# Patient Record
Sex: MALE | Race: WHITE | Employment: UNEMPLOYED | URBAN - METROPOLITAN AREA
[De-identification: names, ages, dates, MRNs, and addresses within clinical notes are randomized per-mention and may not be internally consistent; named-entity substitution may affect disease eponyms.]

---

## 2021-01-01 ENCOUNTER — APPOINTMENT (OUTPATIENT)
Dept: MRI IMAGING | Age: 71
DRG: 180 | End: 2021-01-01
Attending: PHYSICIAN ASSISTANT
Payer: MEDICARE

## 2021-01-01 ENCOUNTER — DOCUMENTATION ONLY (OUTPATIENT)
Dept: ONCOLOGY | Age: 71
End: 2021-01-01

## 2021-01-01 ENCOUNTER — HOSPITAL ENCOUNTER (INPATIENT)
Age: 71
LOS: 4 days | Discharge: HOSPICE | DRG: 180 | End: 2021-01-29
Attending: EMERGENCY MEDICINE | Admitting: STUDENT IN AN ORGANIZED HEALTH CARE EDUCATION/TRAINING PROGRAM
Payer: MEDICARE

## 2021-01-01 ENCOUNTER — HOSPICE ADMISSION (OUTPATIENT)
Dept: HOSPICE | Facility: HOSPICE | Age: 71
End: 2021-01-01
Payer: MEDICARE

## 2021-01-01 ENCOUNTER — HOSPITAL ENCOUNTER (EMERGENCY)
Age: 71
Discharge: OTHER INSTITUTION | End: 2021-01-25
Attending: EMERGENCY MEDICINE
Payer: MEDICARE

## 2021-01-01 ENCOUNTER — APPOINTMENT (OUTPATIENT)
Dept: CT IMAGING | Age: 71
End: 2021-01-01
Attending: EMERGENCY MEDICINE
Payer: MEDICARE

## 2021-01-01 ENCOUNTER — HOSPITAL ENCOUNTER (INPATIENT)
Age: 71
LOS: 1 days | End: 2021-01-30
Attending: FAMILY MEDICINE | Admitting: FAMILY MEDICINE
Payer: MEDICARE

## 2021-01-01 ENCOUNTER — APPOINTMENT (OUTPATIENT)
Dept: GENERAL RADIOLOGY | Age: 71
End: 2021-01-01
Attending: EMERGENCY MEDICINE
Payer: MEDICARE

## 2021-01-01 VITALS
RESPIRATION RATE: 16 BRPM | SYSTOLIC BLOOD PRESSURE: 186 MMHG | OXYGEN SATURATION: 98 % | DIASTOLIC BLOOD PRESSURE: 114 MMHG | TEMPERATURE: 97.9 F | HEART RATE: 80 BPM

## 2021-01-01 VITALS
OXYGEN SATURATION: 71 % | RESPIRATION RATE: 14 BRPM | TEMPERATURE: 95.9 F | HEART RATE: 83 BPM | DIASTOLIC BLOOD PRESSURE: 50 MMHG | SYSTOLIC BLOOD PRESSURE: 102 MMHG

## 2021-01-01 VITALS
BODY MASS INDEX: 21.14 KG/M2 | WEIGHT: 151 LBS | RESPIRATION RATE: 20 BRPM | HEART RATE: 90 BPM | TEMPERATURE: 97.8 F | SYSTOLIC BLOOD PRESSURE: 173 MMHG | HEIGHT: 71 IN | OXYGEN SATURATION: 96 % | DIASTOLIC BLOOD PRESSURE: 120 MMHG

## 2021-01-01 DIAGNOSIS — C34.92 PRIMARY MALIGNANT NEOPLASM OF LEFT LUNG METASTATIC TO OTHER SITE (HCC): ICD-10-CM

## 2021-01-01 DIAGNOSIS — R13.10 DYSPHAGIA, UNSPECIFIED TYPE: ICD-10-CM

## 2021-01-01 DIAGNOSIS — R91.8 MASS OF UPPER LOBE OF LEFT LUNG: ICD-10-CM

## 2021-01-01 DIAGNOSIS — R06.4 LABORED BREATHING: ICD-10-CM

## 2021-01-01 DIAGNOSIS — S22.051A CLOSED STABLE BURST FRACTURE OF FIFTH THORACIC VERTEBRA, INITIAL ENCOUNTER (HCC): ICD-10-CM

## 2021-01-01 DIAGNOSIS — G89.3 CANCER RELATED PAIN: ICD-10-CM

## 2021-01-01 DIAGNOSIS — R06.82 TACHYPNEA: ICD-10-CM

## 2021-01-01 DIAGNOSIS — Z51.5 HOSPICE CARE: ICD-10-CM

## 2021-01-01 DIAGNOSIS — R53.81 PHYSICAL DEBILITY: ICD-10-CM

## 2021-01-01 DIAGNOSIS — C79.51 BONE METASTASES (HCC): ICD-10-CM

## 2021-01-01 DIAGNOSIS — E83.52 HYPERCALCEMIA: ICD-10-CM

## 2021-01-01 DIAGNOSIS — J90 PLEURAL EFFUSION, LEFT: ICD-10-CM

## 2021-01-01 DIAGNOSIS — J90 PLEURAL EFFUSION: Primary | ICD-10-CM

## 2021-01-01 DIAGNOSIS — S22.051A CLOSED STABLE BURST FRACTURE OF FIFTH THORACIC VERTEBRA, INITIAL ENCOUNTER (HCC): Primary | ICD-10-CM

## 2021-01-01 DIAGNOSIS — R62.7 FAILURE TO THRIVE IN ADULT: ICD-10-CM

## 2021-01-01 LAB
ALBUMIN SERPL-MCNC: 2.3 G/DL (ref 3.5–5)
ALBUMIN SERPL-MCNC: 2.3 G/DL (ref 3.5–5)
ALBUMIN/GLOB SERPL: 0.4 {RATIO} (ref 1.1–2.2)
ALBUMIN/GLOB SERPL: 0.4 {RATIO} (ref 1.1–2.2)
ALP SERPL-CCNC: 102 U/L (ref 45–117)
ALP SERPL-CCNC: 106 U/L (ref 45–117)
ALT SERPL-CCNC: 20 U/L (ref 12–78)
ALT SERPL-CCNC: 23 U/L (ref 12–78)
ANION GAP SERPL CALC-SCNC: 11 MMOL/L (ref 5–15)
ANION GAP SERPL CALC-SCNC: 6 MMOL/L (ref 5–15)
ANION GAP SERPL CALC-SCNC: 9 MMOL/L (ref 5–15)
ANION GAP SERPL CALC-SCNC: 9 MMOL/L (ref 5–15)
AST SERPL-CCNC: 100 U/L (ref 15–37)
AST SERPL-CCNC: 95 U/L (ref 15–37)
ATRIAL RATE: 96 BPM
BASOPHILS # BLD: 0 K/UL (ref 0–0.1)
BASOPHILS NFR BLD: 0 % (ref 0–1)
BILIRUB SERPL-MCNC: 1.1 MG/DL (ref 0.2–1)
BILIRUB SERPL-MCNC: 1.3 MG/DL (ref 0.2–1)
BUN SERPL-MCNC: 28 MG/DL (ref 6–20)
BUN SERPL-MCNC: 29 MG/DL (ref 6–20)
BUN SERPL-MCNC: 30 MG/DL (ref 6–20)
BUN SERPL-MCNC: 31 MG/DL (ref 6–20)
BUN/CREAT SERPL: 20 (ref 12–20)
BUN/CREAT SERPL: 26 (ref 12–20)
BUN/CREAT SERPL: 26 (ref 12–20)
BUN/CREAT SERPL: 29 (ref 12–20)
CALCIUM SERPL-MCNC: 10.2 MG/DL (ref 8.5–10.1)
CALCIUM SERPL-MCNC: 11 MG/DL (ref 8.5–10.1)
CALCIUM SERPL-MCNC: 11 MG/DL (ref 8.5–10.1)
CALCIUM SERPL-MCNC: 11.1 MG/DL (ref 8.5–10.1)
CALCIUM SERPL-MCNC: 11.1 MG/DL (ref 8.5–10.1)
CALCULATED P AXIS, ECG09: 60 DEGREES
CALCULATED R AXIS, ECG10: 29 DEGREES
CALCULATED T AXIS, ECG11: 75 DEGREES
CHLORIDE SERPL-SCNC: 100 MMOL/L (ref 97–108)
CHLORIDE SERPL-SCNC: 97 MMOL/L (ref 97–108)
CO2 SERPL-SCNC: 22 MMOL/L (ref 21–32)
CO2 SERPL-SCNC: 25 MMOL/L (ref 21–32)
CO2 SERPL-SCNC: 26 MMOL/L (ref 21–32)
CO2 SERPL-SCNC: 30 MMOL/L (ref 21–32)
COVID-19 RAPID TEST, COVR: NOT DETECTED
CREAT SERPL-MCNC: 1.07 MG/DL (ref 0.7–1.3)
CREAT SERPL-MCNC: 1.1 MG/DL (ref 0.7–1.3)
CREAT SERPL-MCNC: 1.17 MG/DL (ref 0.7–1.3)
CREAT SERPL-MCNC: 1.39 MG/DL (ref 0.7–1.3)
DIAGNOSIS, 93000: NORMAL
DIFFERENTIAL METHOD BLD: ABNORMAL
EOSINOPHIL # BLD: 0 K/UL (ref 0–0.4)
EOSINOPHIL NFR BLD: 0 % (ref 0–7)
ERYTHROCYTE [DISTWIDTH] IN BLOOD BY AUTOMATED COUNT: 13.3 % (ref 11.5–14.5)
ERYTHROCYTE [DISTWIDTH] IN BLOOD BY AUTOMATED COUNT: 13.4 % (ref 11.5–14.5)
ETHANOL SERPL-MCNC: <10 MG/DL
GLOBULIN SER CALC-MCNC: 5.2 G/DL (ref 2–4)
GLOBULIN SER CALC-MCNC: 5.4 G/DL (ref 2–4)
GLUCOSE SERPL-MCNC: 102 MG/DL (ref 65–100)
GLUCOSE SERPL-MCNC: 113 MG/DL (ref 65–100)
GLUCOSE SERPL-MCNC: 137 MG/DL (ref 65–100)
GLUCOSE SERPL-MCNC: 150 MG/DL (ref 65–100)
HCT VFR BLD AUTO: 33.2 % (ref 36.6–50.3)
HCT VFR BLD AUTO: 35.5 % (ref 36.6–50.3)
HGB BLD-MCNC: 11.1 G/DL (ref 12.1–17)
HGB BLD-MCNC: 11.9 G/DL (ref 12.1–17)
IMM GRANULOCYTES # BLD AUTO: 0.3 K/UL (ref 0–0.04)
IMM GRANULOCYTES NFR BLD AUTO: 2 % (ref 0–0.5)
LYMPHOCYTES # BLD: 0.9 K/UL (ref 0.8–3.5)
LYMPHOCYTES NFR BLD: 5 % (ref 12–49)
MCH RBC QN AUTO: 28.4 PG (ref 26–34)
MCH RBC QN AUTO: 28.5 PG (ref 26–34)
MCHC RBC AUTO-ENTMCNC: 33.4 G/DL (ref 30–36.5)
MCHC RBC AUTO-ENTMCNC: 33.5 G/DL (ref 30–36.5)
MCV RBC AUTO: 84.7 FL (ref 80–99)
MCV RBC AUTO: 85.3 FL (ref 80–99)
MONOCYTES # BLD: 1.2 K/UL (ref 0–1)
MONOCYTES NFR BLD: 7 % (ref 5–13)
NEUTS SEG # BLD: 14.6 K/UL (ref 1.8–8)
NEUTS SEG NFR BLD: 86 % (ref 32–75)
NRBC # BLD: 0 K/UL (ref 0–0.01)
NRBC # BLD: 0 K/UL (ref 0–0.01)
NRBC BLD-RTO: 0 PER 100 WBC
NRBC BLD-RTO: 0 PER 100 WBC
P-R INTERVAL, ECG05: 148 MS
PLATELET # BLD AUTO: 369 K/UL (ref 150–400)
PLATELET # BLD AUTO: 431 K/UL (ref 150–400)
PMV BLD AUTO: 10.4 FL (ref 8.9–12.9)
PMV BLD AUTO: 9.9 FL (ref 8.9–12.9)
POTASSIUM SERPL-SCNC: 3.8 MMOL/L (ref 3.5–5.1)
POTASSIUM SERPL-SCNC: 4 MMOL/L (ref 3.5–5.1)
POTASSIUM SERPL-SCNC: 4.1 MMOL/L (ref 3.5–5.1)
POTASSIUM SERPL-SCNC: 4.2 MMOL/L (ref 3.5–5.1)
PROT SERPL-MCNC: 7.5 G/DL (ref 6.4–8.2)
PROT SERPL-MCNC: 7.7 G/DL (ref 6.4–8.2)
Q-T INTERVAL, ECG07: 382 MS
QRS DURATION, ECG06: 88 MS
QTC CALCULATION (BEZET), ECG08: 482 MS
RBC # BLD AUTO: 3.89 M/UL (ref 4.1–5.7)
RBC # BLD AUTO: 4.19 M/UL (ref 4.1–5.7)
SARS-COV-2, COV2: NORMAL
SODIUM SERPL-SCNC: 132 MMOL/L (ref 136–145)
SODIUM SERPL-SCNC: 133 MMOL/L (ref 136–145)
SODIUM SERPL-SCNC: 134 MMOL/L (ref 136–145)
SODIUM SERPL-SCNC: 136 MMOL/L (ref 136–145)
SOURCE, COVRS: NORMAL
TROPONIN I SERPL-MCNC: <0.05 NG/ML
VENTRICULAR RATE, ECG03: 96 BPM
WBC # BLD AUTO: 17.2 K/UL (ref 4.1–11.1)
WBC # BLD AUTO: 19.1 K/UL (ref 4.1–11.1)

## 2021-01-01 PROCEDURE — 74011000250 HC RX REV CODE- 250: Performed by: FAMILY MEDICINE

## 2021-01-01 PROCEDURE — 77010033678 HC OXYGEN DAILY

## 2021-01-01 PROCEDURE — 72148 MRI LUMBAR SPINE W/O DYE: CPT

## 2021-01-01 PROCEDURE — 74011250636 HC RX REV CODE- 250/636: Performed by: STUDENT IN AN ORGANIZED HEALTH CARE EDUCATION/TRAINING PROGRAM

## 2021-01-01 PROCEDURE — 94760 N-INVAS EAR/PLS OXIMETRY 1: CPT

## 2021-01-01 PROCEDURE — 0656 HSPC GENERAL INPATIENT

## 2021-01-01 PROCEDURE — 74011250636 HC RX REV CODE- 250/636: Performed by: FAMILY MEDICINE

## 2021-01-01 PROCEDURE — 96375 TX/PRO/DX INJ NEW DRUG ADDON: CPT

## 2021-01-01 PROCEDURE — 74011250637 HC RX REV CODE- 250/637: Performed by: FAMILY MEDICINE

## 2021-01-01 PROCEDURE — 99233 SBSQ HOSP IP/OBS HIGH 50: CPT | Performed by: INTERNAL MEDICINE

## 2021-01-01 PROCEDURE — 74011000636 HC RX REV CODE- 636: Performed by: EMERGENCY MEDICINE

## 2021-01-01 PROCEDURE — 99223 1ST HOSP IP/OBS HIGH 75: CPT | Performed by: NURSE PRACTITIONER

## 2021-01-01 PROCEDURE — 97161 PT EVAL LOW COMPLEX 20 MIN: CPT

## 2021-01-01 PROCEDURE — 65660000000 HC RM CCU STEPDOWN

## 2021-01-01 PROCEDURE — 70553 MRI BRAIN STEM W/O & W/DYE: CPT

## 2021-01-01 PROCEDURE — 99285 EMERGENCY DEPT VISIT HI MDM: CPT

## 2021-01-01 PROCEDURE — 99357 PR PROLONGED SVC I/P REQ UNIT/FLOOR TIME EA 30 MIN: CPT | Performed by: NURSE PRACTITIONER

## 2021-01-01 PROCEDURE — 51798 US URINE CAPACITY MEASURE: CPT

## 2021-01-01 PROCEDURE — 36415 COLL VENOUS BLD VENIPUNCTURE: CPT

## 2021-01-01 PROCEDURE — 80048 BASIC METABOLIC PNL TOTAL CA: CPT

## 2021-01-01 PROCEDURE — 97165 OT EVAL LOW COMPLEX 30 MIN: CPT

## 2021-01-01 PROCEDURE — 74011250636 HC RX REV CODE- 250/636: Performed by: NURSE PRACTITIONER

## 2021-01-01 PROCEDURE — 74011636320 HC RX REV CODE- 636/320: Performed by: FAMILY MEDICINE

## 2021-01-01 PROCEDURE — 74011250636 HC RX REV CODE- 250/636: Performed by: EMERGENCY MEDICINE

## 2021-01-01 PROCEDURE — 97530 THERAPEUTIC ACTIVITIES: CPT

## 2021-01-01 PROCEDURE — 74011250637 HC RX REV CODE- 250/637: Performed by: NURSE PRACTITIONER

## 2021-01-01 PROCEDURE — 74011250637 HC RX REV CODE- 250/637: Performed by: STUDENT IN AN ORGANIZED HEALTH CARE EDUCATION/TRAINING PROGRAM

## 2021-01-01 PROCEDURE — 72141 MRI NECK SPINE W/O DYE: CPT

## 2021-01-01 PROCEDURE — 70450 CT HEAD/BRAIN W/O DYE: CPT

## 2021-01-01 PROCEDURE — 85025 COMPLETE CBC W/AUTO DIFF WBC: CPT

## 2021-01-01 PROCEDURE — 92526 ORAL FUNCTION THERAPY: CPT

## 2021-01-01 PROCEDURE — 99223 1ST HOSP IP/OBS HIGH 75: CPT | Performed by: INTERNAL MEDICINE

## 2021-01-01 PROCEDURE — 71260 CT THORAX DX C+: CPT

## 2021-01-01 PROCEDURE — 65270000029 HC RM PRIVATE

## 2021-01-01 PROCEDURE — 82077 ASSAY SPEC XCP UR&BREATH IA: CPT

## 2021-01-01 PROCEDURE — 99232 SBSQ HOSP IP/OBS MODERATE 35: CPT | Performed by: NURSE PRACTITIONER

## 2021-01-01 PROCEDURE — 99223 1ST HOSP IP/OBS HIGH 75: CPT | Performed by: FAMILY MEDICINE

## 2021-01-01 PROCEDURE — 74011250637 HC RX REV CODE- 250/637: Performed by: EMERGENCY MEDICINE

## 2021-01-01 PROCEDURE — 71046 X-RAY EXAM CHEST 2 VIEWS: CPT

## 2021-01-01 PROCEDURE — 80053 COMPREHEN METABOLIC PANEL: CPT

## 2021-01-01 PROCEDURE — 92610 EVALUATE SWALLOWING FUNCTION: CPT

## 2021-01-01 PROCEDURE — 74011000250 HC RX REV CODE- 250: Performed by: NURSE PRACTITIONER

## 2021-01-01 PROCEDURE — 96374 THER/PROPH/DIAG INJ IV PUSH: CPT

## 2021-01-01 PROCEDURE — 87635 SARS-COV-2 COVID-19 AMP PRB: CPT

## 2021-01-01 PROCEDURE — 99357 PR PROLONGED SVC I/P REQ UNIT/FLOOR TIME EA 30 MIN: CPT | Performed by: FAMILY MEDICINE

## 2021-01-01 PROCEDURE — 82310 ASSAY OF CALCIUM: CPT

## 2021-01-01 PROCEDURE — 97535 SELF CARE MNGMENT TRAINING: CPT

## 2021-01-01 PROCEDURE — 3336500001 HSPC ELECTION

## 2021-01-01 PROCEDURE — 96376 TX/PRO/DX INJ SAME DRUG ADON: CPT

## 2021-01-01 PROCEDURE — 92612 ENDOSCOPY SWALLOW (FEES) VID: CPT

## 2021-01-01 PROCEDURE — 85027 COMPLETE CBC AUTOMATED: CPT

## 2021-01-01 PROCEDURE — 99233 SBSQ HOSP IP/OBS HIGH 50: CPT | Performed by: NURSE PRACTITIONER

## 2021-01-01 PROCEDURE — 93005 ELECTROCARDIOGRAM TRACING: CPT

## 2021-01-01 PROCEDURE — 94640 AIRWAY INHALATION TREATMENT: CPT

## 2021-01-01 PROCEDURE — 72146 MRI CHEST SPINE W/O DYE: CPT

## 2021-01-01 PROCEDURE — 84484 ASSAY OF TROPONIN QUANT: CPT

## 2021-01-01 PROCEDURE — A9575 INJ GADOTERATE MEGLUMI 0.1ML: HCPCS | Performed by: FAMILY MEDICINE

## 2021-01-01 RX ORDER — HYDROMORPHONE HYDROCHLORIDE 1 MG/ML
0.5 INJECTION, SOLUTION INTRAMUSCULAR; INTRAVENOUS; SUBCUTANEOUS ONCE
Status: COMPLETED | OUTPATIENT
Start: 2021-01-01 | End: 2021-01-01

## 2021-01-01 RX ORDER — SENNOSIDES 8.6 MG/1
1 TABLET ORAL DAILY
Status: DISCONTINUED | OUTPATIENT
Start: 2021-01-01 | End: 2021-01-01 | Stop reason: HOSPADM

## 2021-01-01 RX ORDER — MORPHINE SULFATE 4 MG/ML
3 INJECTION INTRAVENOUS
Status: DISCONTINUED | OUTPATIENT
Start: 2021-01-01 | End: 2021-01-01 | Stop reason: HOSPADM

## 2021-01-01 RX ORDER — CLONIDINE 0.2 MG/24H
1 PATCH, EXTENDED RELEASE TRANSDERMAL
Status: DISCONTINUED | OUTPATIENT
Start: 2021-01-01 | End: 2021-01-01 | Stop reason: HOSPADM

## 2021-01-01 RX ORDER — CLONIDINE 0.1 MG/24H
1 PATCH, EXTENDED RELEASE TRANSDERMAL
Status: DISCONTINUED | OUTPATIENT
Start: 2021-01-01 | End: 2021-01-01

## 2021-01-01 RX ORDER — DEXAMETHASONE SODIUM PHOSPHATE 4 MG/ML
4 INJECTION, SOLUTION INTRA-ARTICULAR; INTRALESIONAL; INTRAMUSCULAR; INTRAVENOUS; SOFT TISSUE EVERY 6 HOURS
Status: DISCONTINUED | OUTPATIENT
Start: 2021-01-01 | End: 2021-01-01

## 2021-01-01 RX ORDER — ONDANSETRON 2 MG/ML
4 INJECTION INTRAMUSCULAR; INTRAVENOUS
Status: DISCONTINUED | OUTPATIENT
Start: 2021-01-01 | End: 2021-01-01 | Stop reason: HOSPADM

## 2021-01-01 RX ORDER — LORAZEPAM 2 MG/ML
0.5 INJECTION INTRAMUSCULAR
Status: DISCONTINUED | OUTPATIENT
Start: 2021-01-01 | End: 2021-01-01 | Stop reason: HOSPADM

## 2021-01-01 RX ORDER — POLYETHYLENE GLYCOL 3350 17 G/17G
17 POWDER, FOR SOLUTION ORAL DAILY PRN
Status: DISCONTINUED | OUTPATIENT
Start: 2021-01-01 | End: 2021-01-01

## 2021-01-01 RX ORDER — AMOXICILLIN 250 MG
1 CAPSULE ORAL
Status: COMPLETED | OUTPATIENT
Start: 2021-01-01 | End: 2021-01-01

## 2021-01-01 RX ORDER — LIDOCAINE HYDROCHLORIDE 20 MG/ML
JELLY TOPICAL AS NEEDED
Status: DISCONTINUED | OUTPATIENT
Start: 2021-01-01 | End: 2021-01-01

## 2021-01-01 RX ORDER — SODIUM CHLORIDE 0.9 % (FLUSH) 0.9 %
10 SYRINGE (ML) INJECTION
Status: COMPLETED | OUTPATIENT
Start: 2021-01-01 | End: 2021-01-01

## 2021-01-01 RX ORDER — HYDROMORPHONE HYDROCHLORIDE 1 MG/ML
0.5 INJECTION, SOLUTION INTRAMUSCULAR; INTRAVENOUS; SUBCUTANEOUS
Status: DISCONTINUED | OUTPATIENT
Start: 2021-01-01 | End: 2021-01-01

## 2021-01-01 RX ORDER — DEXAMETHASONE SODIUM PHOSPHATE 10 MG/ML
10 INJECTION INTRAMUSCULAR; INTRAVENOUS ONCE
Status: COMPLETED | OUTPATIENT
Start: 2021-01-01 | End: 2021-01-01

## 2021-01-01 RX ORDER — GLYCOPYRROLATE 0.2 MG/ML
0.2 INJECTION INTRAMUSCULAR; INTRAVENOUS
Status: DISCONTINUED | OUTPATIENT
Start: 2021-01-01 | End: 2021-01-01 | Stop reason: HOSPADM

## 2021-01-01 RX ORDER — HYDROMORPHONE HYDROCHLORIDE 1 MG/ML
1 INJECTION, SOLUTION INTRAMUSCULAR; INTRAVENOUS; SUBCUTANEOUS
Status: COMPLETED | OUTPATIENT
Start: 2021-01-01 | End: 2021-01-01

## 2021-01-01 RX ORDER — LORAZEPAM 2 MG/ML
0.5 INJECTION INTRAMUSCULAR EVERY 4 HOURS
Status: DISCONTINUED | OUTPATIENT
Start: 2021-01-01 | End: 2021-01-01 | Stop reason: HOSPADM

## 2021-01-01 RX ORDER — HYDROMORPHONE HYDROCHLORIDE 2 MG/1
2 TABLET ORAL EVERY 4 HOURS
Status: DISCONTINUED | OUTPATIENT
Start: 2021-01-01 | End: 2021-01-01

## 2021-01-01 RX ORDER — LORAZEPAM 2 MG/ML
0.5 INJECTION INTRAMUSCULAR EVERY 4 HOURS
Status: DISCONTINUED | OUTPATIENT
Start: 2021-01-01 | End: 2021-01-01

## 2021-01-01 RX ORDER — SODIUM CHLORIDE 0.9 % (FLUSH) 0.9 %
5-40 SYRINGE (ML) INJECTION EVERY 8 HOURS
Status: DISCONTINUED | OUTPATIENT
Start: 2021-01-01 | End: 2021-01-01 | Stop reason: HOSPADM

## 2021-01-01 RX ORDER — NALOXONE HYDROCHLORIDE 0.4 MG/ML
0.4 INJECTION, SOLUTION INTRAMUSCULAR; INTRAVENOUS; SUBCUTANEOUS ONCE
Status: COMPLETED | OUTPATIENT
Start: 2021-01-01 | End: 2021-01-01

## 2021-01-01 RX ORDER — LORAZEPAM 2 MG/ML
1 INJECTION INTRAMUSCULAR
Status: DISCONTINUED | OUTPATIENT
Start: 2021-01-01 | End: 2021-01-01 | Stop reason: HOSPADM

## 2021-01-01 RX ORDER — LORAZEPAM 0.5 MG/1
0.5 TABLET ORAL
Status: DISCONTINUED | OUTPATIENT
Start: 2021-01-01 | End: 2021-01-01

## 2021-01-01 RX ORDER — KETOROLAC TROMETHAMINE 30 MG/ML
30 INJECTION, SOLUTION INTRAMUSCULAR; INTRAVENOUS
Status: DISCONTINUED | OUTPATIENT
Start: 2021-01-01 | End: 2021-01-01 | Stop reason: HOSPADM

## 2021-01-01 RX ORDER — FACIAL-BODY WIPES
10 EACH TOPICAL DAILY PRN
Status: DISCONTINUED | OUTPATIENT
Start: 2021-01-01 | End: 2021-01-01 | Stop reason: HOSPADM

## 2021-01-01 RX ORDER — HYDROMORPHONE HYDROCHLORIDE 2 MG/1
1 TABLET ORAL
Status: DISCONTINUED | OUTPATIENT
Start: 2021-01-01 | End: 2021-01-01

## 2021-01-01 RX ORDER — ENOXAPARIN SODIUM 100 MG/ML
40 INJECTION SUBCUTANEOUS DAILY
Status: DISCONTINUED | OUTPATIENT
Start: 2021-01-01 | End: 2021-01-01

## 2021-01-01 RX ORDER — MORPHINE SULFATE 15 MG/1
15 TABLET, FILM COATED, EXTENDED RELEASE ORAL EVERY 12 HOURS
Status: DISCONTINUED | OUTPATIENT
Start: 2021-01-01 | End: 2021-01-01

## 2021-01-01 RX ORDER — SODIUM CHLORIDE 0.9 % (FLUSH) 0.9 %
5-10 SYRINGE (ML) INJECTION
Status: COMPLETED | OUTPATIENT
Start: 2021-01-01 | End: 2021-01-01

## 2021-01-01 RX ORDER — ACETAMINOPHEN 325 MG/1
650 TABLET ORAL
Status: DISCONTINUED | OUTPATIENT
Start: 2021-01-01 | End: 2021-01-01 | Stop reason: HOSPADM

## 2021-01-01 RX ORDER — ALBUTEROL SULFATE 0.83 MG/ML
2.5 SOLUTION RESPIRATORY (INHALATION)
Status: DISCONTINUED | OUTPATIENT
Start: 2021-01-01 | End: 2021-01-01 | Stop reason: HOSPADM

## 2021-01-01 RX ORDER — LISINOPRIL 20 MG/1
20 TABLET ORAL DAILY
Status: DISCONTINUED | OUTPATIENT
Start: 2021-01-01 | End: 2021-01-01

## 2021-01-01 RX ORDER — HYDROMORPHONE HYDROCHLORIDE 1 MG/ML
1 INJECTION, SOLUTION INTRAMUSCULAR; INTRAVENOUS; SUBCUTANEOUS
Status: DISCONTINUED | OUTPATIENT
Start: 2021-01-01 | End: 2021-01-01

## 2021-01-01 RX ORDER — ACETAMINOPHEN 500 MG
1000 TABLET ORAL ONCE
Status: COMPLETED | OUTPATIENT
Start: 2021-01-01 | End: 2021-01-01

## 2021-01-01 RX ORDER — ACETAMINOPHEN 650 MG/1
650 SUPPOSITORY RECTAL
Status: DISCONTINUED | OUTPATIENT
Start: 2021-01-01 | End: 2021-01-01 | Stop reason: HOSPADM

## 2021-01-01 RX ORDER — MORPHINE SULFATE 2 MG/ML
3 INJECTION, SOLUTION INTRAMUSCULAR; INTRAVENOUS EVERY 4 HOURS
Status: DISCONTINUED | OUTPATIENT
Start: 2021-01-01 | End: 2021-01-01 | Stop reason: HOSPADM

## 2021-01-01 RX ORDER — GUAIFENESIN 600 MG/1
600 TABLET, EXTENDED RELEASE ORAL EVERY 12 HOURS
Status: DISCONTINUED | OUTPATIENT
Start: 2021-01-01 | End: 2021-01-01

## 2021-01-01 RX ORDER — FENTANYL CITRATE 50 UG/ML
50 INJECTION, SOLUTION INTRAMUSCULAR; INTRAVENOUS
Status: COMPLETED | OUTPATIENT
Start: 2021-01-01 | End: 2021-01-01

## 2021-01-01 RX ORDER — HYDROMORPHONE HYDROCHLORIDE 1 MG/ML
0.2 INJECTION, SOLUTION INTRAMUSCULAR; INTRAVENOUS; SUBCUTANEOUS
Status: DISCONTINUED | OUTPATIENT
Start: 2021-01-01 | End: 2021-01-01

## 2021-01-01 RX ORDER — HYDROMORPHONE HYDROCHLORIDE 2 MG/ML
2 INJECTION, SOLUTION INTRAMUSCULAR; INTRAVENOUS; SUBCUTANEOUS
Status: DISCONTINUED | OUTPATIENT
Start: 2021-01-01 | End: 2021-01-01

## 2021-01-01 RX ORDER — NALOXONE HYDROCHLORIDE 0.4 MG/ML
0.4 INJECTION, SOLUTION INTRAMUSCULAR; INTRAVENOUS; SUBCUTANEOUS AS NEEDED
Status: DISCONTINUED | OUTPATIENT
Start: 2021-01-01 | End: 2021-01-01

## 2021-01-01 RX ORDER — HYDROMORPHONE HYDROCHLORIDE 1 MG/ML
0.5 INJECTION, SOLUTION INTRAMUSCULAR; INTRAVENOUS; SUBCUTANEOUS
Status: COMPLETED | OUTPATIENT
Start: 2021-01-01 | End: 2021-01-01

## 2021-01-01 RX ORDER — SODIUM CHLORIDE 9 MG/ML
100 INJECTION, SOLUTION INTRAVENOUS CONTINUOUS
Status: DISCONTINUED | OUTPATIENT
Start: 2021-01-01 | End: 2021-01-01

## 2021-01-01 RX ORDER — PROMETHAZINE HYDROCHLORIDE 25 MG/1
12.5 TABLET ORAL
Status: DISCONTINUED | OUTPATIENT
Start: 2021-01-01 | End: 2021-01-01 | Stop reason: HOSPADM

## 2021-01-01 RX ORDER — MORPHINE SULFATE 2 MG/ML
3 INJECTION, SOLUTION INTRAMUSCULAR; INTRAVENOUS
Status: DISCONTINUED | OUTPATIENT
Start: 2021-01-01 | End: 2021-01-01 | Stop reason: HOSPADM

## 2021-01-01 RX ORDER — HYDROMORPHONE HYDROCHLORIDE 1 MG/ML
0.3 INJECTION, SOLUTION INTRAMUSCULAR; INTRAVENOUS; SUBCUTANEOUS
Status: DISCONTINUED | OUTPATIENT
Start: 2021-01-01 | End: 2021-01-01

## 2021-01-01 RX ORDER — MORPHINE SULFATE 4 MG/ML
3 INJECTION INTRAVENOUS EVERY 4 HOURS
Status: DISCONTINUED | OUTPATIENT
Start: 2021-01-01 | End: 2021-01-01 | Stop reason: HOSPADM

## 2021-01-01 RX ORDER — LORAZEPAM 2 MG/ML
1 INJECTION INTRAMUSCULAR EVERY 4 HOURS
Status: DISCONTINUED | OUTPATIENT
Start: 2021-01-01 | End: 2021-01-01 | Stop reason: HOSPADM

## 2021-01-01 RX ORDER — NALOXONE HYDROCHLORIDE 0.4 MG/ML
INJECTION, SOLUTION INTRAMUSCULAR; INTRAVENOUS; SUBCUTANEOUS
Status: DISPENSED
Start: 2021-01-01 | End: 2021-01-01

## 2021-01-01 RX ORDER — SODIUM CHLORIDE 0.9 % (FLUSH) 0.9 %
5-40 SYRINGE (ML) INJECTION AS NEEDED
Status: DISCONTINUED | OUTPATIENT
Start: 2021-01-01 | End: 2021-01-01 | Stop reason: HOSPADM

## 2021-01-01 RX ORDER — HYDRALAZINE HYDROCHLORIDE 20 MG/ML
10 INJECTION INTRAMUSCULAR; INTRAVENOUS
Status: DISCONTINUED | OUTPATIENT
Start: 2021-01-01 | End: 2021-01-01

## 2021-01-01 RX ORDER — HYDROCHLOROTHIAZIDE 25 MG/1
12.5 TABLET ORAL DAILY
Status: DISCONTINUED | OUTPATIENT
Start: 2021-01-01 | End: 2021-01-01

## 2021-01-01 RX ORDER — HYDROMORPHONE HYDROCHLORIDE 2 MG/1
1 TABLET ORAL EVERY 4 HOURS
Status: DISCONTINUED | OUTPATIENT
Start: 2021-01-01 | End: 2021-01-01

## 2021-01-01 RX ADMIN — DEXAMETHASONE SODIUM PHOSPHATE 4 MG: 4 INJECTION, SOLUTION INTRAMUSCULAR; INTRAVENOUS at 12:50

## 2021-01-01 RX ADMIN — HYDROMORPHONE HYDROCHLORIDE 1 MG: 2 TABLET ORAL at 19:30

## 2021-01-01 RX ADMIN — DEXAMETHASONE SODIUM PHOSPHATE 4 MG: 4 INJECTION, SOLUTION INTRAMUSCULAR; INTRAVENOUS at 07:34

## 2021-01-01 RX ADMIN — Medication 10 ML: at 23:21

## 2021-01-01 RX ADMIN — HYDRALAZINE HYDROCHLORIDE 10 MG: 20 INJECTION INTRAMUSCULAR; INTRAVENOUS at 15:00

## 2021-01-01 RX ADMIN — DEXAMETHASONE SODIUM PHOSPHATE 4 MG: 4 INJECTION, SOLUTION INTRAMUSCULAR; INTRAVENOUS at 12:58

## 2021-01-01 RX ADMIN — HYDROMORPHONE HYDROCHLORIDE 0.5 MG: 1 INJECTION, SOLUTION INTRAMUSCULAR; INTRAVENOUS; SUBCUTANEOUS at 17:41

## 2021-01-01 RX ADMIN — HYDROMORPHONE HYDROCHLORIDE 0.5 MG: 1 INJECTION, SOLUTION INTRAMUSCULAR; INTRAVENOUS; SUBCUTANEOUS at 05:47

## 2021-01-01 RX ADMIN — MORPHINE SULFATE 3 MG: 2 INJECTION, SOLUTION INTRAMUSCULAR; INTRAVENOUS at 15:35

## 2021-01-01 RX ADMIN — MORPHINE SULFATE 3 MG: 2 INJECTION, SOLUTION INTRAMUSCULAR; INTRAVENOUS at 16:59

## 2021-01-01 RX ADMIN — HYDROMORPHONE HYDROCHLORIDE 0.5 MG: 1 INJECTION, SOLUTION INTRAMUSCULAR; INTRAVENOUS; SUBCUTANEOUS at 12:50

## 2021-01-01 RX ADMIN — MORPHINE SULFATE 15 MG: 15 TABLET, FILM COATED, EXTENDED RELEASE ORAL at 23:20

## 2021-01-01 RX ADMIN — HYDROMORPHONE HYDROCHLORIDE 0.5 MG: 1 INJECTION, SOLUTION INTRAMUSCULAR; INTRAVENOUS; SUBCUTANEOUS at 02:15

## 2021-01-01 RX ADMIN — Medication 10 ML: at 21:02

## 2021-01-01 RX ADMIN — MORPHINE SULFATE 3 MG: 4 INJECTION INTRAVENOUS at 07:47

## 2021-01-01 RX ADMIN — HYDROMORPHONE HYDROCHLORIDE 0.5 MG: 1 INJECTION, SOLUTION INTRAMUSCULAR; INTRAVENOUS; SUBCUTANEOUS at 10:13

## 2021-01-01 RX ADMIN — LISINOPRIL 20 MG: 20 TABLET ORAL at 08:12

## 2021-01-01 RX ADMIN — DEXAMETHASONE SODIUM PHOSPHATE 4 MG: 4 INJECTION, SOLUTION INTRAMUSCULAR; INTRAVENOUS at 11:16

## 2021-01-01 RX ADMIN — DEXAMETHASONE SODIUM PHOSPHATE 4 MG: 4 INJECTION, SOLUTION INTRAMUSCULAR; INTRAVENOUS at 18:42

## 2021-01-01 RX ADMIN — HYDROMORPHONE HYDROCHLORIDE 1 MG: 2 TABLET ORAL at 04:04

## 2021-01-01 RX ADMIN — LISINOPRIL 20 MG: 20 TABLET ORAL at 11:27

## 2021-01-01 RX ADMIN — LORAZEPAM 1 MG: 2 INJECTION INTRAMUSCULAR; INTRAVENOUS at 23:06

## 2021-01-01 RX ADMIN — ENOXAPARIN SODIUM 40 MG: 40 INJECTION SUBCUTANEOUS at 08:19

## 2021-01-01 RX ADMIN — FAMOTIDINE 20 MG: 10 INJECTION, SOLUTION INTRAVENOUS at 21:56

## 2021-01-01 RX ADMIN — SODIUM CHLORIDE 100 ML/HR: 900 INJECTION, SOLUTION INTRAVENOUS at 02:29

## 2021-01-01 RX ADMIN — SODIUM CHLORIDE 100 ML/HR: 900 INJECTION, SOLUTION INTRAVENOUS at 21:26

## 2021-01-01 RX ADMIN — HYDROMORPHONE HYDROCHLORIDE 0.5 MG: 1 INJECTION, SOLUTION INTRAMUSCULAR; INTRAVENOUS; SUBCUTANEOUS at 18:42

## 2021-01-01 RX ADMIN — Medication 10 ML: at 05:48

## 2021-01-01 RX ADMIN — Medication 10 ML: at 21:57

## 2021-01-01 RX ADMIN — Medication 10 ML: at 06:16

## 2021-01-01 RX ADMIN — HYDROCHLOROTHIAZIDE 12.5 MG: 25 TABLET ORAL at 11:14

## 2021-01-01 RX ADMIN — LORAZEPAM 0.5 MG: 2 INJECTION INTRAMUSCULAR; INTRAVENOUS at 15:07

## 2021-01-01 RX ADMIN — Medication 10 ML: at 12:37

## 2021-01-01 RX ADMIN — HYDROMORPHONE HYDROCHLORIDE 2 MG: 2 TABLET ORAL at 04:00

## 2021-01-01 RX ADMIN — Medication 10 ML: at 22:00

## 2021-01-01 RX ADMIN — GADOTERATE MEGLUMINE 10 ML: 376.9 INJECTION INTRAVENOUS at 21:02

## 2021-01-01 RX ADMIN — HYDROCHLOROTHIAZIDE 12.5 MG: 25 TABLET ORAL at 08:41

## 2021-01-01 RX ADMIN — HYDROMORPHONE HYDROCHLORIDE 0.5 MG: 1 INJECTION, SOLUTION INTRAMUSCULAR; INTRAVENOUS; SUBCUTANEOUS at 17:47

## 2021-01-01 RX ADMIN — FAMOTIDINE 20 MG: 10 INJECTION, SOLUTION INTRAVENOUS at 20:40

## 2021-01-01 RX ADMIN — FAMOTIDINE 20 MG: 10 INJECTION, SOLUTION INTRAVENOUS at 08:12

## 2021-01-01 RX ADMIN — LORAZEPAM 0.5 MG: 0.5 TABLET ORAL at 16:37

## 2021-01-01 RX ADMIN — DEXAMETHASONE SODIUM PHOSPHATE 4 MG: 4 INJECTION, SOLUTION INTRAMUSCULAR; INTRAVENOUS at 00:00

## 2021-01-01 RX ADMIN — MORPHINE SULFATE 3 MG: 4 INJECTION INTRAVENOUS at 18:59

## 2021-01-01 RX ADMIN — SODIUM CHLORIDE 100 ML/HR: 900 INJECTION, SOLUTION INTRAVENOUS at 16:27

## 2021-01-01 RX ADMIN — HYDROMORPHONE HYDROCHLORIDE 0.2 MG: 1 INJECTION, SOLUTION INTRAMUSCULAR; INTRAVENOUS; SUBCUTANEOUS at 14:03

## 2021-01-01 RX ADMIN — Medication 10 ML: at 13:00

## 2021-01-01 RX ADMIN — DEXAMETHASONE SODIUM PHOSPHATE 4 MG: 4 INJECTION, SOLUTION INTRAMUSCULAR; INTRAVENOUS at 19:30

## 2021-01-01 RX ADMIN — DEXAMETHASONE SODIUM PHOSPHATE 4 MG: 4 INJECTION, SOLUTION INTRAMUSCULAR; INTRAVENOUS at 06:11

## 2021-01-01 RX ADMIN — SODIUM CHLORIDE 100 ML/HR: 900 INJECTION, SOLUTION INTRAVENOUS at 08:13

## 2021-01-01 RX ADMIN — POLYETHYLENE GLYCOL 3350 17 G: 17 POWDER, FOR SOLUTION ORAL at 08:28

## 2021-01-01 RX ADMIN — HYDROMORPHONE HYDROCHLORIDE 0.2 MG: 1 INJECTION, SOLUTION INTRAMUSCULAR; INTRAVENOUS; SUBCUTANEOUS at 09:24

## 2021-01-01 RX ADMIN — HYDROMORPHONE HYDROCHLORIDE 0.5 MG: 1 INJECTION, SOLUTION INTRAMUSCULAR; INTRAVENOUS; SUBCUTANEOUS at 08:14

## 2021-01-01 RX ADMIN — LISINOPRIL 20 MG: 20 TABLET ORAL at 08:42

## 2021-01-01 RX ADMIN — HYDROMORPHONE HYDROCHLORIDE 1 MG: 2 TABLET ORAL at 23:59

## 2021-01-01 RX ADMIN — ZOLEDRONIC ACID 4 MG: 0.04 INJECTION, SOLUTION INTRAVENOUS at 10:32

## 2021-01-01 RX ADMIN — HYDROMORPHONE HYDROCHLORIDE 0.3 MG: 1 INJECTION, SOLUTION INTRAMUSCULAR; INTRAVENOUS; SUBCUTANEOUS at 12:21

## 2021-01-01 RX ADMIN — NALXONE HYDROCHLORIDE 0.4 MG: 0.4 INJECTION INTRAMUSCULAR; INTRAVENOUS; SUBCUTANEOUS at 23:35

## 2021-01-01 RX ADMIN — HYDROMORPHONE HYDROCHLORIDE 0.5 MG: 1 INJECTION, SOLUTION INTRAMUSCULAR; INTRAVENOUS; SUBCUTANEOUS at 14:45

## 2021-01-01 RX ADMIN — HYDROCHLOROTHIAZIDE 12.5 MG: 25 TABLET ORAL at 08:12

## 2021-01-01 RX ADMIN — HYDROMORPHONE HYDROCHLORIDE 2 MG: 2 TABLET ORAL at 11:11

## 2021-01-01 RX ADMIN — MORPHINE SULFATE 3 MG: 4 INJECTION INTRAVENOUS at 19:58

## 2021-01-01 RX ADMIN — LORAZEPAM 1 MG: 2 INJECTION INTRAMUSCULAR; INTRAVENOUS at 18:59

## 2021-01-01 RX ADMIN — DEXAMETHASONE SODIUM PHOSPHATE 4 MG: 4 INJECTION, SOLUTION INTRAMUSCULAR; INTRAVENOUS at 00:14

## 2021-01-01 RX ADMIN — LORAZEPAM 1 MG: 2 INJECTION INTRAMUSCULAR; INTRAVENOUS at 07:48

## 2021-01-01 RX ADMIN — Medication 10 ML: at 15:01

## 2021-01-01 RX ADMIN — HYDROMORPHONE HYDROCHLORIDE 0.5 MG: 1 INJECTION, SOLUTION INTRAMUSCULAR; INTRAVENOUS; SUBCUTANEOUS at 00:27

## 2021-01-01 RX ADMIN — HYDROMORPHONE HYDROCHLORIDE 2 MG: 2 TABLET ORAL at 23:20

## 2021-01-01 RX ADMIN — HYDROCHLOROTHIAZIDE 12.5 MG: 25 TABLET ORAL at 08:18

## 2021-01-01 RX ADMIN — LORAZEPAM 1 MG: 2 INJECTION INTRAMUSCULAR; INTRAVENOUS at 03:11

## 2021-01-01 RX ADMIN — HYDRALAZINE HYDROCHLORIDE 10 MG: 20 INJECTION INTRAMUSCULAR; INTRAVENOUS at 00:21

## 2021-01-01 RX ADMIN — Medication 10 ML: at 16:24

## 2021-01-01 RX ADMIN — HYDROMORPHONE HYDROCHLORIDE 1 MG: 1 INJECTION, SOLUTION INTRAMUSCULAR; INTRAVENOUS; SUBCUTANEOUS at 18:18

## 2021-01-01 RX ADMIN — HYDROMORPHONE HYDROCHLORIDE 0.5 MG: 1 INJECTION, SOLUTION INTRAMUSCULAR; INTRAVENOUS; SUBCUTANEOUS at 06:11

## 2021-01-01 RX ADMIN — LISINOPRIL 20 MG: 20 TABLET ORAL at 08:19

## 2021-01-01 RX ADMIN — HYDROMORPHONE HYDROCHLORIDE 0.5 MG: 1 INJECTION, SOLUTION INTRAMUSCULAR; INTRAVENOUS; SUBCUTANEOUS at 22:03

## 2021-01-01 RX ADMIN — LORAZEPAM 0.5 MG: 0.5 TABLET ORAL at 02:03

## 2021-01-01 RX ADMIN — DEXAMETHASONE SODIUM PHOSPHATE 10 MG: 10 INJECTION, SOLUTION INTRAMUSCULAR; INTRAVENOUS at 21:28

## 2021-01-01 RX ADMIN — MORPHINE SULFATE 3 MG: 4 INJECTION INTRAVENOUS at 23:06

## 2021-01-01 RX ADMIN — ALBUTEROL SULFATE 2.5 MG: 2.5 SOLUTION RESPIRATORY (INHALATION) at 05:36

## 2021-01-01 RX ADMIN — HYDROMORPHONE HYDROCHLORIDE 1 MG: 2 TABLET ORAL at 11:59

## 2021-01-01 RX ADMIN — FAMOTIDINE 20 MG: 10 INJECTION, SOLUTION INTRAVENOUS at 23:20

## 2021-01-01 RX ADMIN — SODIUM CHLORIDE 1000 ML: 9 INJECTION, SOLUTION INTRAVENOUS at 16:06

## 2021-01-01 RX ADMIN — DEXAMETHASONE SODIUM PHOSPHATE 4 MG: 4 INJECTION, SOLUTION INTRAMUSCULAR; INTRAVENOUS at 18:15

## 2021-01-01 RX ADMIN — HYDROMORPHONE HYDROCHLORIDE 1 MG: 2 TABLET ORAL at 11:14

## 2021-01-01 RX ADMIN — HYDROMORPHONE HYDROCHLORIDE 1 MG: 2 TABLET ORAL at 16:26

## 2021-01-01 RX ADMIN — ENOXAPARIN SODIUM 40 MG: 40 INJECTION SUBCUTANEOUS at 08:12

## 2021-01-01 RX ADMIN — ENOXAPARIN SODIUM 40 MG: 40 INJECTION SUBCUTANEOUS at 08:48

## 2021-01-01 RX ADMIN — SODIUM CHLORIDE 100 ML/HR: 900 INJECTION, SOLUTION INTRAVENOUS at 07:14

## 2021-01-01 RX ADMIN — HYDROMORPHONE HYDROCHLORIDE 1 MG: 1 INJECTION, SOLUTION INTRAMUSCULAR; INTRAVENOUS; SUBCUTANEOUS at 19:02

## 2021-01-01 RX ADMIN — HYDROMORPHONE HYDROCHLORIDE 1 MG: 2 TABLET ORAL at 07:34

## 2021-01-01 RX ADMIN — MORPHINE SULFATE 3 MG: 4 INJECTION INTRAVENOUS at 03:11

## 2021-01-01 RX ADMIN — LORAZEPAM 1 MG: 2 INJECTION INTRAMUSCULAR; INTRAVENOUS at 19:59

## 2021-01-01 RX ADMIN — DEXAMETHASONE SODIUM PHOSPHATE 4 MG: 4 INJECTION, SOLUTION INTRAMUSCULAR; INTRAVENOUS at 02:03

## 2021-01-01 RX ADMIN — FENTANYL CITRATE 50 MCG: 50 INJECTION INTRAMUSCULAR; INTRAVENOUS at 17:17

## 2021-01-01 RX ADMIN — HYDROMORPHONE HYDROCHLORIDE 0.5 MG: 1 INJECTION, SOLUTION INTRAMUSCULAR; INTRAVENOUS; SUBCUTANEOUS at 21:56

## 2021-01-01 RX ADMIN — MORPHINE SULFATE 15 MG: 15 TABLET, FILM COATED, EXTENDED RELEASE ORAL at 11:11

## 2021-01-01 RX ADMIN — DOCUSATE SODIUM 50 MG AND SENNOSIDES 8.6 MG 1 TABLET: 8.6; 5 TABLET, FILM COATED ORAL at 14:16

## 2021-01-01 RX ADMIN — Medication 10 ML: at 14:19

## 2021-01-01 RX ADMIN — ACETAMINOPHEN 1000 MG: 500 TABLET ORAL at 16:07

## 2021-01-01 RX ADMIN — FAMOTIDINE 20 MG: 10 INJECTION, SOLUTION INTRAVENOUS at 08:19

## 2021-01-01 RX ADMIN — IOPAMIDOL 100 ML: 755 INJECTION, SOLUTION INTRAVENOUS at 16:24

## 2021-01-01 RX ADMIN — LORAZEPAM 0.5 MG: 2 INJECTION INTRAMUSCULAR; INTRAVENOUS at 16:58

## 2021-01-01 RX ADMIN — HYDROMORPHONE HYDROCHLORIDE 1 MG: 1 INJECTION, SOLUTION INTRAMUSCULAR; INTRAVENOUS; SUBCUTANEOUS at 14:59

## 2021-01-01 RX ADMIN — LORAZEPAM 0.5 MG: 2 INJECTION INTRAMUSCULAR; INTRAVENOUS at 15:33

## 2021-01-01 RX ADMIN — HYDROMORPHONE HYDROCHLORIDE 2 MG: 2 INJECTION INTRAMUSCULAR; INTRAVENOUS; SUBCUTANEOUS at 21:28

## 2021-01-01 RX ADMIN — MORPHINE SULFATE 3 MG: 2 INJECTION, SOLUTION INTRAMUSCULAR; INTRAVENOUS at 14:16

## 2021-01-25 PROBLEM — C34.90 METASTATIC LUNG CANCER (METASTASIS FROM LUNG TO OTHER SITE) (HCC): Status: ACTIVE | Noted: 2021-01-01

## 2021-01-25 NOTE — ED TRIAGE NOTES
Pt arrives under ECO in custody of [employer], per [facility] counselor, the pt's daughter initiated and ECO because the patient is losing weight, not eating, laying in bed and saying things like, \"I just want to lay in bed and die. \"

## 2021-01-25 NOTE — ED PROVIDER NOTES
EMERGENCY DEPARTMENT HISTORY AND PHYSICAL EXAM 
 
 
Date: 1/25/2021 Patient Name: Cayetano Olmstead History of Presenting Illness Chief Complaint Patient presents with  Mental Health Problem History Provided By: Jocelyn Rich, police, and patient HPI: Cayetano Olmstead, 78 y.o. male with past medical history significant for hypertension, hepatitis C, alcohol abuse, and depression who presents in police custody under an ECO to the ED with cc of generalized malaise, apathy, decreased appetite, and a 50 pound weight loss over the past 2 months. Patient also complains of right-sided chest pain for the past 2 to 3 days. His pain is intermittent in nature and he denies any other symptoms including shortness of breath, nausea, vomiting, or diarrhea. When asked why he has been eating, he states that he has no appetite. He does endorse some depression but denies any suicidal or homicidal ideations. His daughter states that she saw him [facility] and since then, he has progressively deteriorated. He normally is very neat and tidy but his house was dirty when she showed up and there were old pizza boxes from 2 weeks ago on the ground. According to his daughter, he has been saying that he just wants to lay in bed and die. He does have one suicidal attempt in the past back in his college days but denies being on any medications at this time. PMHx: Hypertension, hepatitis C, alcohol abuse, and depression Social Hx: Smokes 1/2 pack/day, occasional alcohol use, denies illegal drug use PCP: Mel Hanna MD 
 
There are no other complaints, changes, or physical findings at this time. No current facility-administered medications on file prior to encounter. Current Outpatient Medications on File Prior to Encounter Medication Sig Dispense Refill  nortriptyline (PAMELOR) 50 mg capsule Take 50 mg by mouth nightly.  acetaminophen-codeine (TYLENOL-CODEINE #3) 300-30 mg per tablet Take 1 Tab by mouth every four (4) hours as needed.  hydrocodone-acetaminophen (LORTAB) 7.5-325 mg per tablet Take  by mouth as directed. 2-3 per day prn     
 diazepam (VALIUM) 5 mg tablet Take 5 mg by mouth every twelve (12) hours as needed.  lisinopril-hydrochlorothiazide (PRINZIDE, ZESTORETIC) 20-12.5 mg per tablet Take 1 Tab by mouth daily.  telmisartan-hydrochlorothiazide (MICARDIS HCT) 80-12.5 mg per tablet Take 1 Tab by mouth daily.  gabapentin (NEURONTIN) 100 mg capsule Take 400 mg by mouth two (2) times a day. Indications: NEUROPATHIC PAIN Past History Past Medical History: 
Past Medical History:  
Diagnosis Date  Abnormal ECG 6/2/2011  Chronic back pain  Chronic cervical pain  Colitis  Dyspnea  Essential hypertension 11 yrs  Palpitation  Stone, kidney Past Surgical History: 
Past Surgical History:  
Procedure Laterality Date  ECHO EXERCISE STRESS  6/9/2011  
 normal, no ischemia Family History: 
Family History Problem Relation Age of Onset  Heart Surgery Father   
     bicuspid aortic valve Social History: 
Social History Tobacco Use  Smoking status: Current Some Day Smoker  Smokeless tobacco: Current User Substance Use Topics  Alcohol use: Yes Comment: 3-5 beers  Drug use: Not on file Allergies: Allergies Allergen Reactions  Bactrim [Sulfamethoprim Ds] Unknown (comments) Review of Systems Review of Systems Constitutional: Positive for activity change, appetite change, fatigue and unexpected weight change. Negative for chills and fever. HENT: Negative for congestion, rhinorrhea, sneezing and sore throat. Eyes: Negative for redness and visual disturbance. Respiratory: Negative for shortness of breath. Cardiovascular: Positive for chest pain. Negative for leg swelling. Gastrointestinal: Negative for abdominal pain, nausea and vomiting. Genitourinary: Negative for difficulty urinating and frequency. Musculoskeletal: Negative for back pain, myalgias and neck stiffness. Skin: Negative for rash. Neurological: Negative for dizziness, syncope, weakness and headaches. Hematological: Negative for adenopathy. Psychiatric/Behavioral: Positive for confusion, decreased concentration and dysphoric mood. Negative for self-injury and suicidal ideas. The patient is not nervous/anxious. All other systems reviewed and are negative. Physical Exam  
Physical Exam 
Vitals signs and nursing note reviewed. Constitutional:   
   Appearance: Normal appearance. He is well-developed. Comments: Thin, unkempt HENT:  
   Head: Normocephalic and atraumatic. Neck: Musculoskeletal: Full passive range of motion without pain, normal range of motion and neck supple. Cardiovascular:  
   Rate and Rhythm: Normal rate and regular rhythm. Pulses: Normal pulses. Heart sounds: Normal heart sounds. No murmur. Pulmonary:  
   Effort: Pulmonary effort is normal. No respiratory distress. Breath sounds: Normal breath sounds. Chest:  
   Chest wall: No tenderness. Abdominal:  
   General: Bowel sounds are normal.  
   Palpations: Abdomen is soft. Tenderness: There is no abdominal tenderness. There is no guarding or rebound. Skin: 
   General: Skin is warm and dry. Findings: No erythema or rash. Neurological:  
   Mental Status: He is alert and oriented to person, place, and time. Psychiatric:     
   Mood and Affect: Mood is depressed. Behavior: Behavior is slowed. Cognition and Memory: Memory is impaired. Diagnostic Study Results Labs - Recent Results (from the past 12 hour(s)) EKG, 12 LEAD, INITIAL Collection Time: 01/25/21  3:10 PM  
Result Value Ref Range  Ventricular Rate 96 BPM  
 Atrial Rate 96 BPM  
 P-R Interval 148 ms QRS Duration 88 ms Q-T Interval 382 ms QTC Calculation (Bezet) 482 ms Calculated P Axis 60 degrees Calculated R Axis 29 degrees Calculated T Axis 75 degrees Diagnosis Normal sinus rhythm Possible Left atrial enlargement , age undetermined Abnormal ECG No previous ECGs available Confirmed by Zarina Nettles M.D., Enrique Kolb ([id number]) on 1/25/2021 5:27:15 PM 
  
TROPONIN I Collection Time: 01/25/21  3:16 PM  
Result Value Ref Range Troponin-I, Qt. <0.05 <0.05 ng/mL ETHYL ALCOHOL Collection Time: 01/25/21  3:16 PM  
Result Value Ref Range ALCOHOL(ETHYL),SERUM <10 <10 MG/DL  
CBC WITH AUTOMATED DIFF Collection Time: 01/25/21  3:16 PM  
Result Value Ref Range WBC 17.2 (H) 4.1 - 11.1 K/uL  
 RBC 4.19 4. 10 - 5.70 M/uL  
 HGB 11.9 (L) 12.1 - 17.0 g/dL HCT 35.5 (L) 36.6 - 50.3 % MCV 84.7 80.0 - 99.0 FL  
 MCH 28.4 26.0 - 34.0 PG  
 MCHC 33.5 30.0 - 36.5 g/dL  
 RDW 13.3 11.5 - 14.5 % PLATELET 471 (H) 683 - 400 K/uL MPV 9.9 8.9 - 12.9 FL  
 NRBC 0.0 0  WBC ABSOLUTE NRBC 0.00 0.00 - 0.01 K/uL NEUTROPHILS 86 (H) 32 - 75 % LYMPHOCYTES 5 (L) 12 - 49 % MONOCYTES 7 5 - 13 % EOSINOPHILS 0 0 - 7 % BASOPHILS 0 0 - 1 % IMMATURE GRANULOCYTES 2 (H) 0.0 - 0.5 % ABS. NEUTROPHILS 14.6 (H) 1.8 - 8.0 K/UL  
 ABS. LYMPHOCYTES 0.9 0.8 - 3.5 K/UL  
 ABS. MONOCYTES 1.2 (H) 0.0 - 1.0 K/UL  
 ABS. EOSINOPHILS 0.0 0.0 - 0.4 K/UL  
 ABS. BASOPHILS 0.0 0.0 - 0.1 K/UL  
 ABS. IMM. GRANS. 0.3 (H) 0.00 - 0.04 K/UL  
 DF AUTOMATED METABOLIC PANEL, COMPREHENSIVE Collection Time: 01/25/21  3:16 PM  
Result Value Ref Range Sodium 136 136 - 145 mmol/L Potassium 4.0 3.5 - 5.1 mmol/L Chloride 97 97 - 108 mmol/L  
 CO2 30 21 - 32 mmol/L Anion gap 9 5 - 15 mmol/L Glucose 113 (H) 65 - 100 mg/dL BUN 28 (H) 6 - 20 MG/DL Creatinine 1.39 (H) 0.70 - 1.30 MG/DL  
 BUN/Creatinine ratio 20 12 - 20 GFR est AA >60 >60 ml/min/1.73m2 GFR est non-AA 51 (L) >60 ml/min/1.73m2 Calcium 11.0 (H) 8.5 - 10.1 MG/DL Bilirubin, total 1.3 (H) 0.2 - 1.0 MG/DL  
 ALT (SGPT) 23 12 - 78 U/L  
 AST (SGOT) 100 (H) 15 - 37 U/L Alk. phosphatase 102 45 - 117 U/L Protein, total 7.7 6.4 - 8.2 g/dL Albumin 2.3 (L) 3.5 - 5.0 g/dL Globulin 5.4 (H) 2.0 - 4.0 g/dL A-G Ratio 0.4 (L) 1.1 - 2.2 Radiologic Studies -  
CT CHEST ABD PELV W CONT Final Result Left upper lobe infiltrative mass with displacement of the trachea and  
compression of the left main pulmonary artery and cut off of the left upper lobe  
bronchus compatible with primary lung malignancy. Multiple osseous metastatic foci, many of which have pathologic fractures as  
described. Of particular concern is the T5 pathologic moderate burst type  
fracture which may resultant cord compression (sagittal image 89). The right  
femoral neck is at high risk for pathologic fracture. L5 and T11 is at high risk  
for pathologic fracture. Mild compression of the T12 vertebra. Right adrenal metastatic lesion. Left pleural effusion and pericardial effusion  
may be malignant. Incidental constipation, bilateral nonobstructing renal calculi. This result was called by me at 1646 hours to Dr. Yvonne Casanova 128 XR CHEST PA LAT Final Result Constellation of findings concerning for cancer. Recommend chest CT to evaluate  
the bilateral hilar fullness, left rib destruction, left pleural effusion, and  
left lung disease. CT HEAD WO CONT Final Result 1. No evidence of intracranial hemorrhage. 2. Presence of small lacunar infarcts bilaterally as described above. Xr Chest Pa Lat Result Date: 1/25/2021 Constellation of findings concerning for cancer. Recommend chest CT to evaluate the bilateral hilar fullness, left rib destruction, left pleural effusion, and left lung disease. Ct Head Wo Cont Result Date: 1/25/2021 1. No evidence of intracranial hemorrhage. 2. Presence of small lacunar infarcts bilaterally as described above. Ct Chest Abd Pelv W Cont Result Date: 1/25/2021 Left upper lobe infiltrative mass with displacement of the trachea and compression of the left main pulmonary artery and cut off of the left upper lobe bronchus compatible with primary lung malignancy. Multiple osseous metastatic foci, many of which have pathologic fractures as described. Of particular concern is the T5 pathologic moderate burst type fracture which may resultant cord compression (sagittal image 89). The right femoral neck is at high risk for pathologic fracture. L5 and T11 is at high risk for pathologic fracture. Mild compression of the T12 vertebra. Right adrenal metastatic lesion. Left pleural effusion and pericardial effusion may be malignant. Incidental constipation, bilateral nonobstructing renal calculi. This result was called by me at 1646 hours to Dr. Francis Later [id number] Medical Decision Making I am the first provider for this patient. I reviewed the vital signs, available nursing notes, past medical history, past surgical history, family history and social history. Vital Signs-Reviewed the patient's vital signs. Patient Vitals for the past 24 hrs: 
 Temp Pulse Resp BP SpO2  
01/25/21 1809 97.8 °F (36.6 °C)   (!) 173/120   
01/25/21 1715  86 19 (!) 174/114 95 % 01/25/21 1700  83 22 (!) 176/96 98 % 01/25/21 1645  87 24 (!) 152/101 97 % 01/25/21 1613  90 18  96 % 01/25/21 1428 98.1 °F (36.7 °C) 98 20 (!) 196/125 97 % Pulse Oximetry Analysis - 97% on RA (normal) Cardiac Monitor:  
Rate: 98 bpm 
Rhythm: Normal Sinus Rhythm ED EKG interpretation: 15:10 Rhythm: normal sinus rhythm; and regular . Rate (approx.): 96; Axis: normal; P wave: normal; QRS interval: normal ; ST/T wave: normal; Other findings: abnormal ekg. This EKG was interpreted by Vandana Garcia MD,ED Provider. Records Reviewed: Nursing Notes Provider Notes (Medical Decision Making):  
72-year-old male brought in by police under an ECO presents with an unexplained weight loss, apathy, failure to thrive, a right-sided chest pain, and inability to take care of himself. Will check basic labs and a chest x-ray to assess for pneumonia, dehydration, metabolic encephalopathy, lung cancer, or other medical reasons for his deterioration. We will also have [facility] see and evaluate the patient for behavioral health disposition. ED Course:  
Initial assessment performed. The patients presenting problems have been discussed, and they are in agreement with the care plan formulated and outlined with them. I have encouraged them to ask questions as they arise throughout their visit. BEDSIDE SIGN OUT: 
4:08 PM 
Discussed pt's hx, disposition, and available diagnostic and imaging results with Dr. Ayde Junior. Reviewed care plans. Both providers and patient are in agreement with care plan. Ishmael Claude, MD is transferring care at this time. Call from radiology, CT scan with left lung mass with metastatic disease to the bones, adrenals, pericardium. Multiple pathologic fractures. Most concerning is a T5 burst fracture. Patient able to move his legs without difficulty. Will discuss with oncology. I feel this patient requires medical admission as opposed to psychiatric admission at this time. Sammy Huerta MD 
 
 
ED Course as of Jan 25 1810 Mon Jan 25, 2021 1657 Spoke with Dr. Paxton Welch, oncology, who reviewed the patient's CT scan. Recommends transfer to [facility] for further management. He will speak with neurosurgery regarding the burst fracture. Brendan Thibodeaux 1711 Results of CT scan discussed with the patient. He is continuing to complain of pain. Suspect this is likely related to the malignancy. Will give additional pain medication. Brendan Thibodeaux [employer] [place] with Dr. Marylen Juba, he spoke with Dr. Gerald Ferraro from neurosurg who recommended sending the patient to [facility] instead of [address] Rusty Sanjayvin 1721 Patient accepted to [facility] ED by Dr. Aubrie Nur 1800 Patient requested I update his family. Spoke with the patient's daughter, Estiven, over the phone and discussed the CT scan results and the plan moving forward including transfer. Rusty Acuña ED Course User Index Felipe Zaidi MD  
 
CRITICAL CARE NOTE : 
 
IMPENDING DETERIORATION -CNS and Metabolic ASSOCIATED RISK FACTORS - CNS Decompensation MANAGEMENT- Bedside Assessment and Transfer INTERPRETATION -  Xrays and CT Scan INTERVENTIONS - Neurologic interventions  and Metobolic interventions CASE REVIEW - Hospitalist, Medical Sub-Specialist, Nursing and Family TREATMENT RESPONSE -Improved PERFORMED BY - Self NOTES   : 
 
I have spent 55 minutes of critical care time involved in lab review, consultations with specialist, family decision- making, bedside attention and documentation. During this entire length of time I was immediately available to the patient. Critical Care: The reason for providing this level of medical care for this critically ill patient was due to a critical illness that impaired one or more vital organ systems such that there was a high probability of imminent or life threatening deterioration in the patients condition. This care involved high complexity decision making to assess, manipulate, and support vital system functions. Alyson Aragon MD 
 
Progress Note:  
Updated pt on all returned results and findings. Discussed the importance of proper follow up as referred below along with return precautions. Pt in agreement with the care plan and expresses agreement with and understanding of all items discussed. Disposition: 
Transfer to [facility] ED Diagnosis Clinical Impression: 1. Pleural effusion 2. Hypercalcemia 3. Failure to thrive in adult 4. Primary malignant neoplasm of left lung metastatic to other site [facility]) 5. Closed stable burst fracture of fifth thoracic vertebra, initial encounter ([facility] [address] Please note that this dictation was completed with Dragon, computer voice recognition software. Quite often unanticipated grammatical, syntax, homophones, and other interpretive errors are inadvertently transcribed by the computer software. Please disregard these errors. Additionally, please excuse any errors that have escaped final proofreading.

## 2021-01-25 NOTE — PROGRESS NOTES
[address] [facility]  at [address] [facility], , [address] [place] [address], [facility] Mckay Yost, [address] [facility]  [phone]        Brief Note      78 yr/M  Widespread metastatic disease, likely lung primary  T5 burst fracture. Will discuss the case Neurosurgery. I have a call out to Dr. Je Mcdonald. Patient [facility]. We shall see him in consultation tomorrow. Case discussed with ED at [facility].       Signed by: Kamaljit Flores MD                     January 25, 2021

## 2021-01-25 NOTE — ED NOTES
Pt is alert to self, repeats his name and date of birth. He also repeats the current year, but when I asked him what day it is today, the pt replied that he did not know. The pt looks disheveled and moves slowly and cautiously. His voice is quiet, his hair is unkempt, he appears frail and unwell. He is hypertensive in triage at 196/125. I asked if he takes any medication daily and he replied that he does not. I asked if he is supposed to take any medication and he replied that he does not know.

## 2021-01-26 NOTE — PROGRESS NOTES
Bedside shift change report given to Lor Jaimes RN (oncoming nurse) by Lien Julian RN (offgoing nurse). Report included the following information SBAR, Kardex, Intake/Output, MAR and Recent Results.

## 2021-01-26 NOTE — PROGRESS NOTES
Problem: Dysphagia (Adult) Goal: *Acute Goals and Plan of Care (Insert Text) Description: Speech Therapy Goals Initiated 1/26/2021 1. Patient will tolerate minced/moist diet and thin liquids without adverse effects within 7 days. 2. Patient will participate in a Fiberoptic Endoscopic Evaluation of Swallow (FEES) at bedside within 3 days. Outcome: Progressing Towards Goal 
  
SPEECH LANGUAGE PATHOLOGY BEDSIDE SWALLOW EVALUATION Patient: Kip Byers (78 y.o. male) Date: 1/26/2021 Primary Diagnosis: Metastatic lung cancer (metastasis from lung to other site) ([facility] [address] [C34.90] Precautions:   Fall ASSESSMENT : 
Based on the objective data described below, the patient presents with concern for dysphagia given new onset aphonia concerning for vocal fold dysfunction as well as new onset difficulty swallowing. Pt reportedly now with difficulty eating \"hard and dry\" foods, although liquids have not caused difficulty. Given these complaints, will plan to complete a Fiberoptic Endoscopic Evaluation of Swallow (FEES) at bedside to objectively assess safety of swallow. In the interim, recommend downgrading diet to minced/moist (NDD2) to aid in pt ability to swallow. Prognosis of swallow function will be dependent on results of FEES. Patient will benefit from skilled intervention to address the above impairments. Patients rehabilitation potential is considered to be Guarded PLAN : 
Recommendations and Planned Interventions: 
--Minced/moist (NDD2) diet/thin liquids --Fiberoptic Endoscopic Evaluation of Swallow (FEES) at bedside- 1/27 Frequency/Duration: Patient will be followed by speech-language pathology 3 times a week to address goals. Discharge Recommendations: To Be Determined SUBJECTIVE:  
Patient stated, \"I'm not going anywhere! . OBJECTIVE:  
 
Past Medical History:  
Diagnosis Date Abnormal ECG 6/2/2011 Chronic back pain Chronic cervical pain Colitis Dyspnea Essential hypertension 11 yrs Palpitation Stone, kidney Past Surgical History:  
Procedure Laterality Date ECHO EXERCISE STRESS  6/9/2011  
 normal, no ischemia Prior Level of Function/Home Situation:  
Home Situation Home Environment: Private residence # Steps to Enter: 3 Rails to Enter: Yes One/Two Story Residence: Two story Living Alone: Yes Current DME Used/Available at Home: Cane, straight Tub or Shower Type: Tub/Shower combination Diet prior to admission: Regular diet/thin liquids Current Diet:  Regular diet/thin liquids Cognitive and Communication Status: 
Neurologic State: Alert Orientation Level: Oriented X4 Cognition: Appropriate safety awareness Perception: Appears intact Perseveration: No perseveration noted Safety/Judgement: Awareness of environment Oral Assessment: 
Oral Assessment Labial: No impairment Dentition: Natural 
Oral Hygiene: oral mucosa dry Lingual: No impairment Velum: No impairment Mandible: No impairment P.O. Trials: 
Patient Position: upright in bed Vocal quality prior to P.O.: Aphonic Consistency Presented: Thin liquid How Presented: Self-fed/presented;Straw;Successive swallows Bolus Acceptance: No impairment Bolus Formation/Control: No impairment Propulsion: No impairment Oral Residue: None Initiation of Swallow: No impairment Laryngeal Elevation: Functional 
Aspiration Signs/Symptoms: None Pharyngeal Phase Characteristics: No impairment, issues, or problems Oral Phase Severity: No impairment Pharyngeal Phase Severity : Other (comment)(at risk) NOMS:  
The NOMS functional outcome measure was used to quantify this patient's level of swallowing impairment. Based on the NOMS, the patient was determined to be at level 5 for swallow function NOMS Swallowing Levels: 
Level 1 (CN): NPO Level 2 (CM): NPO but takes consistency in therapy Level 3 (CL): Takes less than 50% of nutrition p.o. and continues with nonoral feedings; and/or safe with mod cues; and/or max diet restriction Level 4 (CK): Safe swallow but needs mod cues; and/or mod diet restriction; and/or still requires some nonoral feeding/supplements Level 5 (CJ): Safe swallow with min diet restriction; and/or needs min cues Level 6 (CI): Independent with p.o.; rare cues; usually self cues; may need to avoid some foods or needs extra time Level 7 (CH): Independent for all p.o. SANDRA. (2003). National Outcomes Measurement System (NOMS): Adult Speech-Language Pathology User's Guide. Pain: 
Pain Scale 1: Numeric (0 - 10) Pain Intensity 1: 10 
Pain Location 1: Chest 
 
After treatment:  
Call bell within reach and Nursing notified COMMUNICATION/EDUCATION:  
 
The patient's plan of care including recommendations, planned interventions, and recommended diet changes were discussed with: Registered nurse and NP . Patient/family have participated as able in goal setting and plan of care.  
 
Thank you for this referral. 
Triston Jiménez, SLP

## 2021-01-26 NOTE — PROGRESS NOTES
Problem: Mobility Impaired (Adult and Pediatric) Goal: *Acute Goals and Plan of Care (Insert Text) Description: FUNCTIONAL STATUS PRIOR TO ADMISSION: Patient was modified independent using a single point cane for functional mobility. HOME SUPPORT PRIOR TO ADMISSION: The patient lived alone with limited family to provide assistance. Physical Therapy Goals Initiated 1/26/2021 1. Patient will move from supine to sit and sit to supine  and roll side to side in bed with modified independence within 7 day(s). 2.  Patient will transfer from bed to chair and chair to bed with supervision using the least restrictive device within 7 day(s). 3.  Patient will perform sit to stand with supervision within 7 day(s). 4.  Patient will ambulate with minimal assistance/contact guard assist for 100 feet with the least restrictive device within 7 day(s). 5.  Patient will ascend/descend 6 stairs with  handrail(s) with supervision/set-up within 7 day(s). Outcome: Progressing Towards Goal 
 PHYSICAL THERAPY EVALUATION Patient: Sherron Black (82 y.o. male) Date: 1/26/2021 Primary Diagnosis: Metastatic lung cancer (metast[facility] from lung [facility] site[facility] [C34.90] Precautions:   Fall ASSESSMENT Based on the objective data described below, the patient presents with overall significant decreased mobility with general weakness, decreased balance and limited gait. Patient talks very quietly and at times difficult to understand. Once sitting EOB, immediately wanted to return to supine but with encouragement able to assess standing and brief gait. Anticipate patient will likely need rehab at time of discharge as he has not been thriving well at home. Mar Haro Current Level of Function Impacting Discharge (mobility/balance): minimal assist to contact guard; decreased strength and balance Functional Outcome Measure: The patient scored 0 on the Timed up and go outcome measure which is indicative of high fall risk. Other factors to consider for discharge: questionable safety of being at home alone Patient will benefit from skilled therapy intervention to address the above noted impairments. PLAN : 
Recommendations and Planned Interventions: bed mobility training, transfer training, gait training, therapeutic exercises, patient and family training/education, and therapeutic activities Frequency/Duration: Patient will be followed by physical therapy:  5 times a week to address goals. Recommendation for discharge: (in order for the patient to meet his/her long term goals) Therapy up to 5 days/week in SNF setting This discharge recommendation: 
Has not yet been discussed the attending provider and/or case management IF patient discharges home will need the following DME: rolling walker and to be determined (TBD) and increased supervision in home SUBJECTIVE:  
Patient stated I want to lie down now.  OBJECTIVE DATA SUMMARY:  
HISTORY:   
Past Medical History:  
Diagnosis Date  Abnormal ECG 6/2/2011  Chronic back pain  Chronic cervical pain  Colitis  Dyspnea  Essential hypertension 11 yrs  Palpitation  Stone, kidney Past Surgical History:  
Procedure Laterality Date  ECHO EXERCISE STRESS  6/9/2011  
 normal, no ischemia Personal factors and/or comorbidities impacting plan of care:  
 
Home Situation Home Environment: Private residence # Steps to Enter: 3 Rails to Enter: Yes One/Two Story Residence: Two story Living Alone: Yes Current DME Used/Available at Home: Cane, straight Tub or Shower Type: Tub/Shower combination EXAMINATION/PRESENTATION/DECISION MAKING:  
Critical Behavior: 
Neurologic State: Alert, Confused Orientation Level: Oriented to person, Oriented to place, Disoriented to situation, Disoriented to time Cognition: Decreased attention/concentration, Follows commands, Impaired decision making, Memory loss, Poor safety awareness Safety/Judgement: Decreased awareness of environment, Decreased awareness of need for assistance, Decreased awareness of need for safety, Decreased insight into deficits Hearing: 
  
Skin:  see nursing notes Edema: none Range Of Motion: 
AROM: Generally decreased, functional 
  
  
  
  
  
  
  
Strength:   
Strength: Generally decreased, functional 
  
  
  
  
  
  
Tone & Sensation:  
Tone: Normal 
  
  
  
  
Sensation: Intact Coordination: 
Coordination: Generally decreased, functional 
Vision:  
Tracking: Able to track stimulus in all quadrants w/o difficulty Functional Mobility: 
Bed Mobility: 
  
Supine to Sit: Minimum assistance Sit to Supine: Stand-by assistance Scooting: Contact guard assistance Transfers: 
Sit to Stand: Minimum assistance; Adaptive equipment(SPC) Stand to Sit: Minimum assistance; Adaptive equipment(SPC) Balance:  
Sitting: Intact Standing: Impaired; With support(SPC) Standing - Static: Fair Standing - Dynamic : Fair;Poor;Constant support Ambulation/Gait Training: 
  Gait Description (WDL): Exceptions to [facility] Gait Abnormalities: Decreased step clearance; Path deviations;Trunk sway increased Base of Support: Narrowed Speed/Tish: Delayed;Pace decreased (<100 feet/min) Step Length: Left shortened;Right shortened Functional Measure: 
Timed up and go: 
 
Timed Get Up And Go Test: (unable to complete) < than 10 seconds=Normal  Greater then 13.5 seconds (in elderly)=Increased fall risk Germán CASTAÑEDA. Predicting the probability for falls in community dwelling older adults using the Timed Up and Go Test. Phys Ther. 2000;80:896-903. Physical Therapy Evaluation Charge Determination History Examination Presentation Decision-Making MEDIUM  Complexity : 1-2 comorbidities / personal factors will impact the outcome/ POC  LOW Complexity : 1-2 Standardized tests and measures addressing body structure, function, activity limitation and / or participation in recreation  MEDIUM Complexity : Evolving with changing characteristics  Other outcome measures Timed up and go  HIGH Based on the above components, the patient evaluation is determined to be of the following complexity level: LOW Activity Tolerance:  
Poor After treatment patient left in no apparent distress:  
Supine in bed, Call bell within reach, Bed / chair alarm activated, and Side rails x 3 
 
COMMUNICATION/EDUCATION:  
The patients plan of care was discussed with: Occupational therapist and Registered nurse. Patient is unable to participate in goal setting and plan of care. Thank you for this referral. 
Flash Maria, PT Time Calculation: 23 mins

## 2021-01-26 NOTE — PROGRESS NOTES
Spiritual Care Assessment/Progress Note 
[facility] 
 
 
NAME: Edwin Castro      MRN: [id number] 
AGE: 70 y.o. SEX: male 
Episcopalian Affiliation: Unknown  
Language: English  
 
1/26/2021     Total Time (in minutes): 5  
 
Spiritual Assessment begun in [facility] 6[facility] MED ONCOLOGY through conversation with: 
  
    []Patient        [] Family    [] Friend(s)     
 
Reason for Consult: Palliative Care, Initial/Spiritual Assessment  
 
Spiritual beliefs: (Please include comment if needed) 
   [] Identifies with a sarath tradition:     
   [] Supported by a sarath community:        
   [] Claims no spiritual orientation:       
   [] Seeking spiritual identity:            
   [] Adheres to an individual form of spirituality:       
   [x] Not able to assess:                   
 
    
Identified resources for coping:  
   [] Prayer                           
   [] Music                  [] Guided Imagery 
   [] Family/friends                 [] Pet visits 
   [] Devotional reading                         [x] Unknown 
   [] Other:                                          
 
 
Interventions offered during this visit: (See comments for more details) 
  Patient Interventions: Initial visit  
     
 
Plan of Care: 
 
 [] Support spiritual and/or cultural needs  
 [] Support AMD and/or advance care planning process    
 [] Support grieving process 
 [] Coordinate Rites and/or Rituals  
 [] Coordination with community clergy 
 [] No spiritual needs identified at this time 
 [] Detailed Plan of Care below (See Comments)  [] Make referral to Music Therapy 
[] Make referral to Pet Therapy    
[] Make referral to Addiction services 
[] Make referral to [facility] 
[] Make referral to Spiritual Care Partner 
[] No future visits requested       
[x] Follow up upon further referrals  
 
Attempted to visit pt for initial spiritual assessment. Unable to complete assessment at this time, pt sleeping and did not awake. 
 Chaplain Montero MDiv, MS, [facility] 
[phone] ([phone])

## 2021-01-26 NOTE — ED NOTES
Patient altered, attempting to get out of stretcher. Took self off monitor. States \"I'm going home. \" Pt moved to room 22 for better line of sight from RN station. Awaiting admission.

## 2021-01-26 NOTE — PROGRESS NOTES
Problem: Self Care Deficits Care Plan (Adult) Goal: *Acute Goals and Plan of Care (Insert Text) Description:  
FUNCTIONAL STATUS PRIOR TO ADMISSION: Patient is a poor historian, confused (A&O x2), reports being IND prior to admission. HOME SUPPORT: Patient reports he lives alone, poor historian. Occupational Therapy Goals Initiated 1/26/2021 1. Patient will perform grooming with supervision/set-up within 7 day(s). 2.  Patient will perform bathing with supervision/set-up within 7 day(s). 3.  Patient will perform lower body dressing with supervision/set-up within 7 day(s). 4.  Patient will perform toilet transfers with supervision/set-up within 7 day(s). 5.  Patient will perform all aspects of toileting with minimal assistance/contact guard assist within 7 day(s). 6.  Patient will participate in upper extremity therapeutic exercise/activities with supervision/set-up for 5 minutes within 7 day(s). 7.  Patient will utilize energy conservation techniques during functional activities with verbal and visual cues within 7 day(s). Outcome: Not Met OCCUPATIONAL THERAPY EVALUATION Patient: Melly Jarquin (3 y.o. male) Date: 1/26/2021 Primary Diagnosis: Metastatic lung [facility] ([facility] [C34.90] Precautions: Fall ASSESSMENT Based on the objective data described below, the patient presents with decreased balance, safety awareness, activity tolerance, strength, coordination, BLE ROM, cognition, and safety awareness s/p admission for AMS, found to have metastatic lung CA. Patient is a poor historian, A&O x2, reports being IND-Mod I prior to admission w/ SPC use, living alone. He now presents well below his baseline, requiring Min-Mod A for ADLs, limited activity tolerance EOB & standing d/t increasing CP with limited activity. Considering this, recommend d/c to SNF as he remains far from his baseline, high fall risk, not safe to return home alone. Current Level of Function Impacting Discharge (ADLs/self-care): Min-Mod A for ADLs, Min A for limited mobility with SPC use Functional Outcome Measure: The patient scored Total: 40/100 on the Barthel Index outcome measure which is indicative of 60% impaired ability to care for basic self needs/dependency on others; inferred 100% dependency on others for instrumental ADLs. Other factors to consider for discharge: fall risk, CA with mets, ?lives alone but impaired cognition Patient will benefit from skilled therapy intervention to address the above noted impairments. PLAN : 
Recommendations and Planned Interventions: self care training, functional mobility training, therapeutic exercise, balance training, therapeutic activities, endurance activities, patient education, home safety training and family training/education Frequency/Duration: Patient will be followed by occupational therapy 5 times a week to address goals. Recommendation for discharge: (in order for the patient to meet his/her long term goals) Therapy up to 5 days/week in SNF setting This discharge recommendation: A follow-up discussion with the attending provider and/or case management is planned IF patient discharges home will need the following DME: To be determined (TBD) pending progress SUBJECTIVE:  
Patient stated I need to lay back down.  OBJECTIVE DATA SUMMARY:  
HISTORY:  
Past Medical History:  
Diagnosis Date  Abnormal ECG 6/2/2011  Chronic back pain  Chronic cervical pain  Colitis  Dyspnea  Essential hypertension 11 yrs  Palpitation  Stone, kidney Past Surgical History:  
Procedure Laterality Date  ECHO EXERCISE STRESS  6/9/2011  
 normal, no ischemia Expanded or extensive additional review of patient history:  
 
Home Situation Home Environment: Private residence # Steps to Enter: 3 Rails to Enter: Yes One/Two Story Residence: Two story Living Alone: Yes Current DME Used/Available at Home: Cane, straight Tub or Shower Type: Tub/Shower combination Hand dominance: Right EXAMINATION OF PERFORMANCE DEFICITS: 
Cognitive/Behavioral Status: 
Neurologic State: Alert;Confused Orientation Level: Oriented to person;Oriented to place; Disoriented to situation;Disoriented to time Cognition: Decreased attention/concentration; Follows commands; Impaired decision making;Memory loss;Poor safety awareness Perception: Appears intact Perseveration: No perseveration noted Safety/Judgement: Decreased awareness of environment;Decreased awareness of need for assistance;Decreased awareness of need for safety;Decreased insight into deficits Skin: Appears intact Edema: None Hearing: 
  
 
Vision/Perceptual:   
Tracking: Able to track stimulus in all quadrants w/o difficulty Range of Motion: 
AROM: Generally decreased, functional 
  
  
  
  
  
  
  
 
Strength: 
Strength: Generally decreased, functional 
  
  
  
  
 
Coordination: 
Coordination: Generally decreased, functional 
Fine Motor Skills-Upper: Left Intact; Right Intact Gross Motor Skills-Upper: Left Intact; Right Intact Tone & Sensation: 
Tone: Normal 
Sensation: Intact Balance: 
Sitting: Intact Standing: Impaired; With support(SPC) Standing - Static: Fair Standing - Dynamic : Fair;Poor;Constant support Functional Mobility and Transfers for ADLs: 
Bed Mobility: 
Supine to Sit: Minimum assistance Sit to Supine: Stand-by assistance Scooting: Contact guard assistance Transfers: 
Sit to Stand: Minimum assistance; Adaptive equipment(SPC) Stand to Sit: Minimum assistance; Adaptive equipment(SPC) Toilet Transfer : Minimum assistance; Adaptive equipment(infer to [facility] w/ SPC per mobility) Assistive Device : Gait Belt;Cane, straight ADL Assessment: 
Feeding: Independent Oral Facial Hygiene/Grooming: Setup Bathing: Moderate assistance Upper Body Dressing: Setup Lower Body Dressing: Moderate assistance Toileting: Moderate assistance ADL Intervention and task modifications: 
  
 
  
 
  
 
Lower Body Bathing Lower Body : Compensatory technique training Lower Body Dressing Assistance Dressing Assistance: Moderate assistance Pants With Elastic Waist: Moderate assistance; Compensatory technique training(simulated) Socks: Minimum assistance; Compensatory technique training Leg Crossed Method Used: Yes(RLE decreased) Position Performed: Seated edge of bed;Standing Cues: Physical assistance; Tactile cues provided;Verbal cues provided;Visual cues provided Cognitive Retraining Safety/Judgement: Decreased awareness of environment;Decreased awareness of need for assistance;Decreased awareness of need for safety;Decreased insight into deficits Therapeutic Exercise: 
Sit<>stand with SPC use and Min A d/t circumferential sway, brief sidestepping up the bed with Min A Functional Measure: 
Barthel Index: 
 
Bathin Bladder: 0 Bowels: 5 Groomin Dressin Feeding: 10 Mobility: 0 Stairs: 0 Toilet Use: 5 Transfer (Bed to Chair and Back): 10 Total: 40/100 The Barthel ADL Index: Guidelines 1. The index should be used as a record of what a patient does, not as a record of what a patient could do. 2. The main aim is to establish degree of independence from any help, physical or verbal, however minor and for whatever reason. 3. The need for supervision renders the patient not independent. 4. A patient's performance should be established using the best available evidence. Asking the patient, friends/relatives and nurses are the usual sources, but direct observation and common sense are also important. However direct testing is not needed. 5. Usually the patient's performance over the preceding 24-48 hours is important, but occasionally longer periods will be relevant. 6. Middle categories imply that the patient supplies over 50 per cent of the effort. 7. Use of aids to be independent is allowed. Bertina Aschoff., Barthel, D.W. (1375). Functional evaluation: the Barthel Index. [address] [facility] (14)2. Raul Bernabe bart SWATHI Chino, Annette Mcmahan., Hailey Parham., [employer], [address] [place] [address] (1999). Measuring the change indisability after inpatient rehabilitation; comparison of the responsiveness of the Barthel Index and Functional [place] Measure. Journal of Neurology, Neurosurgery, and Psychiatry, 66(4), 487-336. Corina Gurrola, N.J.A, FRANCIS Valiente, & Mindy Sanchez, MSalA. (2004.) Assessment of post-stroke quality of life in cost-effectiveness studies: The usefulness of the Barthel Index and the EuroQoL-5D. [facility], 13, 458-89 Occupational Therapy Evaluation Charge Determination History Examination Decision-Making LOW Complexity : Brief history review  MEDIUM Complexity : 3-5 performance deficits relating to physical, cognitive , or psychosocial skils that result in activity limitations and / or participation restrictions LOW Complexity : No comorbidities that affect functional and no verbal or physical assistance needed to complete eval tasks Based on the above components, the patient evaluation is determined to be of the following complexity level: LOW Pain Rating: 
CP reported, RN aware Activity Tolerance:  
Fair After treatment patient left in no apparent distress:   
Supine in bed, Call bell within reach, Bed / chair alarm activated and Side rails x 3 
 
COMMUNICATION/EDUCATION:  
The patients plan of care was discussed with: Physical therapist and Registered nurse. Home safety education was provided and the patient/caregiver indicated understanding., Patient/family have participated as able in goal setting and plan of care. and Patient/family agree to work toward stated goals and plan of care. This patients plan of care is appropriate for delegation to [facility]. Thank you for this referral. 
Eboni Shabazz, OTD, OTR/L Time Calculation: 16 mins

## 2021-01-26 NOTE — CONSULTS
Palliative Medicine Consult Benjamin: [phone] ([id number]) Patient Name: Warren Zarate YOB: 1950 Date of Initial Consult: 1/26/21 Reason for Consult: Care decisions Requesting Provider:  Dr. Basim Oneil Primary Care Physician: Judy Francois MD 
 
 SUMMARY:  
Warren Zarate is a 78 y.o. male with a past history of hypertension, ETOH use, depression, chronic back/neck pain, colitis, and kidney stones, who was admitted on 1/25/2021 from home with a diagnosis of left upper lobe mass, suspected metastatic disease, closed T5 burst fracture, and a left pleural effusion. He was brought in under [facility] after his family was concerned for his health and safety. He has been losing weight with a poor appetite, has been complaining of rib pain, and mostly staying in bed and making comments about wanting to die. In the ED, imaging revealed a left upper lobe mass, suspected metastatic disease, closed T5 burst fracture, and a left pleural effusion. He was admitted for further work up and management. Current medical issues leading to Palliative Medicine involvement include: suspected metastatic cancer, failure to thrive, pain, need to discuss care decisions. Social: He is single. He three children, daughters Lawanda Chambers, son Mango Ackerman, and several grandchildren. He was living alone prior to admit, but family says that he cannot return home alone. PALLIATIVE DIAGNOSES:  
1. Goals of care 2. Right sided chest pain 3. Anorexia 4. Recent weight loss 5. Failure to thrive 6. Hypoalbuminemia 7. Left upper lobe mass 8. Suspected metastatic disease 9. Depression 10. Chronic neck/back pain 11. T5 burst fracture 12. Hypertension PLAN:  
1. Prior to seeing patient, the medical record was reviewed. 2. Patient seen at the bedside and palliative medicine services introduced. He reports feeling better since admission and that his pain has improved with medication. 3. We talked about the lung mass that was seen on imaging and the plan to do further work up to understand what his diagnosis is and what kind of treatment options he may have. He did not seem to want to engage in conversation about this and kept asking for something to drink due to complaints of dry mouth. Once he was able to have something to drink, he was able to talk a little more. We discussed the need for oncology to see him and to find out what his treatment options are so we can talk through what his goals for his care are. He did not seem to be following the conversation very well. I let him know that I would reach out to his daughter Meño Kaur to talk with her, which he agreed to. Plan made to follow up with him after he has been seen by oncology. 4. Call placed to anusha Kaur and phone conference held with her and her sister Jj Kelley. We reviewed the medical information and his recent issues and decline. The family has been concerned about him for quite sometime, but he was resistant to getting help. They are upset and in shock about the diagnosis, but glad that he is in the hospital getting the help he needs. We talked at length about next steps. The family is anxiously waiting for the oncology consult and recommendations from their team. They understand that the cancer is likely very advanced and that options may be limited. We also talked briefly about hospice care. Plan made to follow up again after the oncology consult to discuss goals of care once we have a better idea of the diagnosis and prognosis. 5. Anusha Kaur also requests a speech evaluation as the family has noticed that he seems to have some increased difficulty swallowing and has very recently started using a \"whispering\" voice. Speech consult placed and case discussed with Allegra JOSHUA. Plans for a FEES test arranged to further evaluate him tomorrow afternoon. 6. The family would also like to speak to the  regarding the possibility of long term care placement, either with or without hospice. They are unsure what his insurance coverage is or whether he has Medicaid or not. We talked about the possible need to start a Medicaid application, depending on his prognosis. Will follow up with case management and the family on this. 7. We also discussed creating an advanced medical directive and placing a referal to [facility] to help with additional paperwork the patient may need to take care of to get his affairs in order. 8. Initial consult note routed to primary continuity provider and/or primary health care team members 9. Communicated plan of care with: Palliative IDT, Christine [facility] Team 
 
 GOALS OF CARE / TREATMENT PREFERENCES:  
 
GOALS OF CARE: 
Patient/Health Care Proxy Stated Goals: Other (comment)(improved pain, find out diagnosis) TREATMENT PREFERENCES:  
Code Status: Full Code Advance Care Planning: 
[] The [employer] Interdisciplinary Team has updated the ACP Navigator with [employer] and Patient Capacity No flowsheet data found. Medical Interventions: Full interventions Other Instructions: Other: As far as possible, the palliative care team has discussed with patient / health care proxy about goals of care / treatment preferences for patient. HISTORY:  
 
History obtained from: patient, chart CHIEF COMPLAINT: Right sided chest pain, failure to thrive HPI/SUBJECTIVE: The patient is:  
[x] Verbal and participatory [] Non-participatory due to: He reports severe pain in his right chest, but says that it feels better after receiving pain medication. He denies shortness of breath or nausea. He is hungry and currently reports a good appetite. He is asking for an egg sandwich. He is complaining of a dry mouth and can only whisper to talk. Clinical Pain Assessment (nonverbal scale for severity on nonverbal patients):  
Clinical Pain Assessment Severity: 6 Location: right chest 
Character: aching Duration: days Frequency: intermittent Duration: for how long has pt been experiencing pain (e.g., 2 days, 1 month, years) Frequency: how often pain is an issue (e.g., several times per day, once every few days, constant) FUNCTIONAL ASSESSMENT:  
 
Palliative Performance Scale (PPS): PPS: 60 PSYCHOSOCIAL/SPIRITUAL SCREENING:  
 
Palliative IDT has assessed this patient for cultural preferences / practices and a referral made as appropriate to needs (Cultural Services, Patient Advocacy, Ethics, etc.) Any spiritual / Buddhist concerns: 
[] Yes /  [x] No 
 
Caregiver Burnout: 
[] Yes /  [] No /  [x] No Caregiver Present Anticipatory grief assessment:  
[x] Normal  / [] Maladaptive ESAS Anxiety: ESAS Depression:    
 
 
 REVIEW OF SYSTEMS:  
 
Positive and pertinent negative findings in ROS are noted above in HPI. The following systems were [x] reviewed / [] unable to be reviewed as noted in HPI Other findings are noted below. Systems: constitutional, ears/nose/mouth/throat, respiratory, gastrointestinal, genitourinary, musculoskeletal, integumentary, neurologic, psychiatric, endocrine. Positive findings noted below. Modified ESAS Completed by: provider Fatigue: 6 Drowsiness: 1 Pain: 6 Nausea: 0 Anorexia: 3 Dyspnea: 0 PHYSICAL EXAM:  
 
From RN flowsheet: 
Wt Readings from Last 3 Encounters:  
01/25/21 151 lb (68.5 kg) 06/02/11 193 lb 12.8 oz (87.9 kg) Blood pressure (!) 156/89, pulse (!) 105, temperature 97.4 °F (36.3 °C), resp. rate 14, SpO2 98 %. Pain Scale 1: Numeric (0 - 10) Pain Intensity 1: 0 Last bowel movement, if known:  
 
Constitutional: awake, alert, frail Eyes: pupils equal, anicteric ENMT: no nasal discharge, dry mucous membranes Cardiovascular: regular rhythm Respiratory: breathing not labored, symmetric Musculoskeletal: no deformity, no tenderness to palpation Skin: warm, dry Neurologic: following commands, moving all extremities Psychiatric: full affect, no hallucinations HISTORY:  
 
Active Problems: Metastatic lung cancer (metastasis from lung to other site) [facility]) (1/25/2021) Past Medical History:  
Diagnosis Date  Abnormal ECG 6/2/2011  Chronic back pain  Chronic cervical pain  Colitis  Dyspnea  Essential hypertension 11 yrs  Palpitation  Stone, kidney Past Surgical History:  
Procedure Laterality Date  ECHO EXERCISE STRESS  6/9/2011  
 normal, no ischemia Family History Problem Relation Age of Onset  Heart Surgery Father   
     bicuspid aortic valve History reviewed, no pertinent family history. Social History Tobacco Use  Smoking status: Current Some Day Smoker  Smokeless tobacco: Current User Substance Use Topics  Alcohol use: Yes Comment: 3-5 beers Allergies Allergen Reactions  Bactrim [Sulfamethoprim Ds] Unknown (comments) Current Facility-Administered Medications Medication Dose Route Frequency  hydrALAZINE (APRESOLINE) 20 mg/mL injection 10 mg  10 mg IntraVENous Q6H PRN  
 lisinopriL (PRINIVIL, ZESTRIL) tablet 20 mg  20 mg Oral DAILY  hydroCHLOROthiazide (HYDRODIURIL) tablet 12.5 mg  12.5 mg Oral DAILY  cloNIDine (CATAPRES) 0.1 mg/24 hr patch 1 Patch  1 Patch TransDERmal Q7D  
 HYDROmorphone (PF) (DILAUDID) injection 0.2 mg  0.2 mg IntraVENous Q3H PRN  
 sodium chloride (NS) flush 5-40 mL  5-40 mL IntraVENous Q8H  
 sodium chloride (NS) flush 5-40 mL  5-40 mL IntraVENous PRN  
 acetaminophen (TYLENOL) tablet 650 mg  650 mg Oral Q6H PRN Or  
 acetaminophen (TYLENOL) suppository 650 mg  650 mg Rectal Q6H PRN  polyethylene glycol (MIRALAX) packet 17 g  17 g Oral DAILY PRN  
  promethazine (PHENERGAN) tablet 12.5 mg  12.5 mg Oral Q6H PRN Or  
 ondansetron (ZOFRAN) injection 4 mg  4 mg IntraVENous Q6H PRN  
 enoxaparin (LOVENOX) injection 40 mg  40 mg SubCUTAneous DAILY  [Held by provider] 0.9% sodium chloride infusion  100 mL/hr IntraVENous CONTINUOUS  
 
 
 
 LAB AND IMAGING FINDINGS:  
 
Lab Results Component Value Date/Time WBC 19.1 (H) 01/26/2021 01:45 AM  
 HGB 11.1 (L) 01/26/2021 01:45 AM  
 PLATELET 341 41/12/2780 01:45 AM  
 
Lab Results Component Value Date/Time Sodium 132 (L) 01/26/2021 01:45 AM  
 Potassium 4.2 01/26/2021 01:45 AM  
 Chloride 100 01/26/2021 01:45 AM  
 CO2 26 01/26/2021 01:45 AM  
 BUN 30 (H) 01/26/2021 01:45 AM  
 Creatinine 1.17 01/26/2021 01:45 AM  
 Calcium 11.0 (H) 01/26/2021 01:45 AM  
  
Lab Results Component Value Date/Time Alk. phosphatase 106 01/26/2021 01:45 AM  
 Protein, total 7.5 01/26/2021 01:45 AM  
 Albumin 2.3 (L) 01/26/2021 01:45 AM  
 Globulin 5.2 (H) 01/26/2021 01:45 AM  
 
No results found for: INR, PTMR, PTP, PT1, PT2, APTT, INREXT, INREXT No results found for: IRON, FE, TIBC, IBCT, PSAT, FERR No results found for: PH, PCO2, PO2 No components found for: [place] No results found for: CPK, CKMB Total time: 70 min Counseling / coordination time, spent as noted above: 65 min 
> 50% counseling / coordination?: yes Prolonged service was provided for  []30 min   []75 min in face to face time in the presence of the patient, spent as noted above. Time Start:  
Time End:  
Note: this can only be billed with 62157 (initial) or 16247 (follow up). If multiple start / stop times, list each separately.

## 2021-01-26 NOTE — PROGRESS NOTES
Hospitalist Progress Note NAME: Laing Lowery :  1950 MRN:  [id number] Assessment / Plan: 
Weight loss/apathy/anorexia: Severe protein calorie malnutrition 
-Likely secondary to metastatic disease 
-Consulted nutrition for dietary supplementation 
 
  
Left upper lobe lung mass Metastatic lung cancer Rib pain 
-Caution with opiate analgesics: Will start on hydromorphone 0.5 mg every 4 hours 
-Patient initially treated with Dilaudid on admission but required Narcan reversal 
-Treat pain with Toradol as needed -Reports severe pain 9/10 
-Heme-onc on consult. Appreciate recs Closed stable burst fracture of fifth thoracic vertebra 
-Likely sequelae of metastatic lung cancer 
-Decadron x1 dose. -Heme-onc and neurosurgery to follow 
  Left pleural effusion 
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 
  
Bilateral nonobstructing renal calculi 
-No acute intervention 
  
Hypertension: With hypertensive urgency, due to essential hypertension and severe pain 
-Resume home Prinzide 
-Add clonidine 
  
Hypercalcemia of Malignancy 
-Hold IV fluid due to hypertension 
-Daily lab 
  
  
  
  
FEN/GI  hold IV fluid due to hypertension Activity - as tolerated DVT prophylaxis - Lovenox GI prophylaxis -  NI Disposition - TBD 
  
CODE STATUS:  Full code Son at bedside updated Subjective: Chief Complaint / Reason for Physician Visit \"**Severe chest pain\". Discussed with RN events overnight. Review of Systems: 
Symptom Y/N Comments  Symptom Y/N Comments Fever/Chills    Chest Pain Poor Appetite    Edema Cough    Abdominal Pain Sputum    Joint Pain SOB/DYER    Pruritis/Rash Nausea/vomit    Tolerating PT/OT Diarrhea    Tolerating Diet Constipation    Other Could NOT obtain due to:   
 
Objective: VITALS:  
Last 24hrs VS reviewed since prior progress note. Most recent are: 
Patient Vitals for the past 24 hrs: 
 Temp Pulse Resp BP SpO2 01/26/21 1132    (!) 156/89   
01/26/21 1124    (!) 152/92   
01/26/21 0956  (!) 105  (!) 149/95   
01/26/21 0840 97.4 °F (36.3 °C) 91 14 (!) 205/119 98 % 01/26/21 0539 98 °F (36.7 °C) 85 12 (!) 170/105 99 % 01/26/21 0058 96.9 °F (36.1 °C) 90 11 (!) 161/93 98 % 01/26/21 0035    (!) 170/108   
01/26/21 0019  88  (!) 179/128 98 % 01/26/21 0018  88  (!) 185/127 98 % 01/25/21 2337  97   97 % 01/25/21 2309 97.4 °F (36.3 °C) 95 8 (!) 159/109 94 % 01/25/21 2238 97.6 °F (36.4 °C) 94 10 (!) 171/131 96 % 01/25/21 2140   12  94 % 01/25/21 2101     93 % 01/25/21 2100    (!) 155/101   
01/25/21 2058     92 % 01/25/21 2057     91 % 01/25/21 2056  89 18 (!) 163/121 93 % 01/25/21 1850 98.5 °F (36.9 °C) 91 12 (!) 149/125 93 % Intake/Output Summary (Last 24 hours) at 1/26/2021 1454 Last data filed at 1/25/2021 2337 Gross per 24 hour Intake  Output 200 ml Net -200 ml I had a face to face encounter and independently examined this patient on 1/26/2021, as outlined below: PHYSICAL EXAM: 
General: WD, WN. Alert, cooperative, no acute distress, ill looking patient EENT:  EOMI. Anicteric sclerae. MMM Resp:  CTA bilaterally, no wheezing or rales. No accessory muscle use CV:  Regular  rhythm,  No edema GI:  Soft, Non distended, Non tender. +Bowel sounds Neurologic:  Alert and oriented , normal speech, Psych:    Not anxious nor agitated Skin:  No rashes. No jaundice Reviewed most current lab test results and cultures  YES Reviewed most current radiology test results   YES Review and summation of old records today    NO Reviewed patient's current orders and MAR    YES 
PMH/SH reviewed - no change compared to H&P 
________________________________________________________________________ Care Plan discussed with: 
  Comments Patient Family RN Care Manager Consultant Multidiciplinary team rounds were held today with , nursing, pharmacist and clinical coordinator. Patient's plan of care was discussed; medications were reviewed and discharge planning was addressed. ________________________________________________________________________ Total NON critical care TIME:  35  Minutes Total CRITICAL CARE TIME Spent:   Minutes non procedure based Comments >50% of visit spent in counseling and coordination of care    
________________________________________________________________________ Korey De La Fuente MD  
 
Procedures: see electronic medical records for all procedures/Xrays and details which were not copied into this note but were reviewed prior to creation of Plan. LABS: 
I reviewed today's most current labs and imaging studies. Pertinent labs include: 
Recent Labs  
  01/26/21 
0145 01/25/21 
1516 WBC 19.1* 17.2* HGB 11.1* 11.9*  
HCT 33.2* 35.5*  431* Recent Labs  
  01/26/21 
0145 01/25/21 
1516 * 136  
K 4.2 4.0  
 97 CO2 26 30 * 113* BUN 30* 28* CREA 1.17 1.39* CA 11.0* 11.0* ALB 2.3* 2.3* TBILI 1.1* 1.3* ALT 20 23 Signed: Korey De La Fuente MD

## 2021-01-26 NOTE — PROGRESS NOTES
Physician Progress Note Faisal No 
CSN #:                  [id number] :                       1950 ADMIT DATE:       2021 6:42 PM 
[address] [place] DATE: 
RESPONDING 
PROVIDER #:        CARINA Garcia MD 
 
 
 
 
QUERY TEXT: 
 
Pt admitted with weight loss, lung mass. Pt noted to have elevated blood pressures on arrival to [facility] and [facility] ED dept's. . If possible, please document in progress notes and discharge summary if you are evaluating and/or treating any of the following: The medical record reflects the following: 
Risk Factors: 79 y/o male- . elevated BP's 140's to 190's over 120's, per historical report Home BP medication Prinzide. found to have a large left pleural effusion, left upper lobe mass, bone mets, T5 burst fracture, and pericardial effusion Clinical Indicators: 21 [facility] ED RN PN: \"He is hypertensive in triage at 196/125\". \"I asked if he takes any medication daily and he replied that he does not. I asked if he is supposed to take any medication and he replied that he does not know\". 21 1850 [facility] ED /125;  163/121; 2100 155/101 
21 RN PN: 
2149 \"During and following report, this RN raised concerns to primary ED RN and nursing supervisor concerning pt BP status. Pt BP in ED elevated at 155/101 (BP taken at 2100)\". 2248- \"Pt BP elevated at 171/131, RN paged MD to request PRN medication\". 0000-MD placed order for 10mg hydralazine IV q6h for CYF>393 or  Diastolic>110. MD instructed charge RN that if BP does not resolve or pt worsens, he will consider transfer to a higher level of care. Treatment: Admit, Hospitalist consult, telemetry, vitals per unit, Medications- Prinivil 20mg po daily, Hydralazine 10mg IV q6 prn SBP >170 DBP >110, *       Hypertensive Crisis, unspecified: at least 2 consecutive readings of SBP > 180 mmHg or DBP > 110 mmHg * Hypertensive Urgency: Hypertensive crisis w/o associated organ dysfunction. S/s may or may not be present, but can include severe headache, SOB, epistaxis, severe anxiety. Tx: adjustment of oral antihypertensives; IV meds not usually required. * Hypertensive Emergency: Hypertensive crisis w/ associated organ damage (stroke, encephalopathy, SANDEEP, MI, angina, acute or decompensated CHF, acute pulmonary edema, HELLP, etc.). Requires immediate treatment (usually IV meds) & possible ICU admission. Associated organ dysfunction needs documented. [email or web address] Options provided: 
-- Hypertensive Emergency -- Hypertensive Urgency -- Other - I will add my own diagnosis -- Disagree - Not applicable / Not valid -- Disagree - Clinically unable to determine / Unknown 
-- Refer to Clinical Documentation Reviewer PROVIDER RESPONSE TEXT: 
 
This patient has hypertensive urgency. Query created by: Falguni Regan on 1/26/2021 8:24 AM 
 
 
QUERY TEXT: 
 
Patient admitted with weight loss, lung mass. If possible, please document in progress notes and discharge summary if you are evaluating and /or treating any of the following: The medical record reflects the following: 
Risk Factors: 79 y/o male to ED for weight loss, anorexia. found to have a large left pleural effusion, left upper lobe mass, bone mets, T5 burst fracture, and pericardial effusion. Clinical Indicators: 1/25/21 ED MD PN: \"Patient arrived at another hospital today with CC weight loss and anorexia. He was found to have a large left pleural effusion, left upper lobe mass, bone mets, T5 burst fracture, and pericardial effusion\". 1/25/21 H&P: chief complaint of weight loss and decreased appetite. Patient states over the last month he feels he may have lost about 30-50 pounds unintentionally. He endorses an overall generalized malaise, fatigue and what he describes as a \"poor quality of life. \" 
1/25/2021 15:16 Albumin: 2.3 (L) Ht: 5' 11\" (1.803 m) Wt: 68.5 kg (151 lb) BMI: 21.06 kg/m Treatment: Admit to hospital, hospitalist consult, Nutrition consult for dietary supplementation, Diet NPO, Hematology consult Options provided: -- Protein calorie malnutrition moderate -- Protein calorie malnutrition severe 
-- Other - I will add my own diagnosis -- Disagree - Not applicable / Not valid -- Disagree - Clinically unable to determine / Unknown 
-- Refer to Clinical Documentation Reviewer PROVIDER RESPONSE TEXT: 
 
This patient has severe protein calorie malnutrition.  
 
Query created by: Laury Ribeiro on 1/26/2021 8:38 AM 
 
 
Electronically signed by:  Zion Shaikh MD 1/26/2021 2:55 PM

## 2021-01-26 NOTE — CONSULTS
Heme/ONC consult Pt seen and note to follow D/w hospitalist.  
Looks like met lung cancer based on scans With possible cord compression at T5 Pt is for MRI spine/ brain No tissue dx yet Pt sleepy Son at bedside and reviewed all with him. Son wants to pursue biopsy for path and possible treatment options Son is realistic about stage 4 cancer and pt's overall poor performance status lately Pt has high calcium Recommend IVF, zometa and decadron. [facility] 
[address] W: [phone]  F: [phone] Reason for Visit:  
Sherron Black is a 78 y.o. male who is seen in consultation at the request of Dr. Sascha Carrillo for evaluation of lung mass. Treatment History:  
None yet History of Present Illness:  
 
Pt seen today for hospital consult for new lung mass. No tissue dx of cancer. Pt transferred here due to possible cord compression on CT. Pt is for MRI spine and brain. Pt clinically declined recently and brought to hospital by police. Son at bedside. Pt is sleeping at my visit. Son says he cannot give much history. Pain in chest per son. Not eating well. Past Medical History:  
Diagnosis Date  Abnormal ECG 6/2/2011  Chronic back pain  Chronic cervical pain  Colitis  Dyspnea  Essential hypertension 11 yrs  Palpitation  Stone, kidney Past Surgical History:  
Procedure Laterality Date  ECHO EXERCISE STRESS  6/9/2011  
 normal, no ischemia Social History Tobacco Use  Smoking status: Current Some Day Smoker  Smokeless tobacco: Current User Substance Use Topics  Alcohol use: Yes Comment: 3-5 beers Family History Problem Relation Age of Onset  Heart Surgery Father   
     bicuspid aortic valve Current Facility-Administered Medications Medication Dose Route Frequency  hydrALAZINE (APRESOLINE) 20 mg/mL injection 10 mg  10 mg IntraVENous Q6H PRN  
 lisinopriL (PRINIVIL, ZESTRIL) tablet 20 mg  20 mg Oral DAILY  hydroCHLOROthiazide (HYDRODIURIL) tablet 12.5 mg  12.5 mg Oral DAILY  HYDROmorphone (PF) (DILAUDID) injection 0.5 mg  0.5 mg IntraVENous Q3H PRN  
 [START ON 1/27/2021] lidocaine (XYLOCAINE) 2 % jelly   Mucous Membrane PRN  
 cloNIDine (CATAPRES) 0.2 mg/24 hr patch 1 Patch  1 Patch TransDERmal Q7D  
 famotidine (PF) (PEPCID) 20 mg in 0.9% sodium chloride 10 mL injection  20 mg IntraVENous Q12H  
 dexamethasone (DECADRON) 4 mg/mL injection 4 mg  4 mg IntraVENous Q6H  
 sodium chloride (NS) flush 5-40 mL  5-40 mL IntraVENous Q8H  
 sodium chloride (NS) flush 5-40 mL  5-40 mL IntraVENous PRN  
 acetaminophen (TYLENOL) tablet 650 mg  650 mg Oral Q6H PRN Or  
 acetaminophen (TYLENOL) suppository 650 mg  650 mg Rectal Q6H PRN  polyethylene glycol (MIRALAX) packet 17 g  17 g Oral DAILY PRN  promethazine (PHENERGAN) tablet 12.5 mg  12.5 mg Oral Q6H PRN Or  
 ondansetron (ZOFRAN) injection 4 mg  4 mg IntraVENous Q6H PRN  
 enoxaparin (LOVENOX) injection 40 mg  40 mg SubCUTAneous DAILY  0.9% sodium chloride infusion  100 mL/hr IntraVENous CONTINUOUS Allergies Allergen Reactions  Bactrim [Sulfamethoprim Ds] Unknown (comments) Review of Systems: A complete review of systems was obtained, negative except as described above. Physical Exam:  
 
Visit Vitals BP (!) 147/92 (BP 1 Location: Left arm, BP Patient Position: Sitting) Pulse 86 Temp 97.6 °F (36.4 °C) Resp 14 SpO2 93% ECOG PS: 2 General: No distress Eyes: anicteric sclerae HENT: Atraumatic Neck: Supple Respiratory: CTAB, normal respiratory effort CV: Normal rate, regular rhythm GI: Soft, nontender, nondistended MS: in bed, general weakness Skin: No rashes, ecchymoses, or petechiae. Normal temperature, turgor, and texture. Psych: sleepy, not conversant Results:  
 
Lab Results Component Value Date/Time WBC 19.1 (H) 01/26/2021 01:45 AM  
 HGB 11.1 (L) 01/26/2021 01:45 AM  
 HCT 33.2 (L) 01/26/2021 01:45 AM  
 PLATELET 401 88/66/4807 01:45 AM  
 MCV 85.3 01/26/2021 01:45 AM  
 ABS. NEUTROPHILS 14.6 (H) 01/25/2021 03:16 PM  
 
Lab Results Component Value Date/Time Sodium 132 (L) 01/26/2021 01:45 AM  
 Potassium 4.2 01/26/2021 01:45 AM  
 Chloride 100 01/26/2021 01:45 AM  
 CO2 26 01/26/2021 01:45 AM  
 Glucose 150 (H) 01/26/2021 01:45 AM  
 BUN 30 (H) 01/26/2021 01:45 AM  
 Creatinine 1.17 01/26/2021 01:45 AM  
 GFR est AA >60 01/26/2021 01:45 AM  
 GFR est non-AA >60 01/26/2021 01:45 AM  
 Calcium 11.0 (H) 01/26/2021 01:45 AM  
 Calcium 11.1 (H) 01/26/2021 01:45 AM  
 
Lab Results Component Value Date/Time Bilirubin, total 1.1 (H) 01/26/2021 01:45 AM  
 ALT (SGPT) 20 01/26/2021 01:45 AM  
 Alk. phosphatase 106 01/26/2021 01:45 AM  
 Protein, total 7.5 01/26/2021 01:45 AM  
 Albumin 2.3 (L) 01/26/2021 01:45 AM  
 Globulin 5.2 (H) 01/26/2021 01:45 AM  
 
CT Results (most recent): 
Results from Hospital Encounter encounter on 01/25/21 CT CHEST ABD PELV W CONT Narrative EXAM: CT CHEST ABD PELV W CONT INDICATION: large left pleural effusion, assess for mass vs metastatic disease COMPARISON: Plain radiograph CONTRAST: 100 mL of Isovue-370. TECHNIQUE:  
Following the uneventful intravenous administration of contrast, thin axial 
images were obtained through the chest, abdomen and pelvis. Coronal and sagittal 
reformats were generated. Oral contrast was administered. CT dose reduction was 
achieved through use of a standardized protocol tailored for this examination 
and automatic exposure control for dose modulation. FINDINGS:  
 
CHEST WALL: No mass or axillary lymphadenopathy. THYROID: No nodule. MEDIASTINUM: No mass or lymphadenopathy. CHANI: No mass or lymphadenopathy. THORACIC AORTA: No dissection or aneurysm. MAIN PULMONARY ARTERY: Left main pulmonary artery is circumferentially 
compressed by the left upper lobe mass that measures 7.4 x 11 x 8.7 cm. (Axial 
image 28 and coronal image 48). There is lateral displacement of the main 
pulmonary trunk by this mass. There is posterior compression of the left 
mainstem bronchus and truncation by this mass (image 29). Left lower lobe 
subsegmental atelectasis. The trachea is displaced to the right. TRACHEA/BRONCHI: Patent. ESOPHAGUS: No wall thickening or dilatation. HEART: Normal in size. Small pericardial effusion. PLEURA: Large left pleural effusion with density measurement of 17.6 HU. 
LUNGS: No nodule, mass, or airspace disease. LIVER: No mass. BILIARY TREE: Gallbladder is within normal limits. CBD is not dilated. SPLEEN: within normal limits. PANCREAS: No mass or ductal dilatation. ADRENALS: Right adrenal low density 2.6 x 1.8 cm mass (image 61). KIDNEYS: Bilateral renal cysts. Bilateral nonobstructing renal calculi. STOMACH: Unremarkable. SMALL BOWEL: No dilatation or wall thickening. COLON: No dilatation or wall thickening. Moderate amount of stool. APPENDIX: Normal on coronal image 56). PERITONEUM: No ascites or pneumoperitoneum. RETROPERITONEUM: No lymphadenopathy or aortic aneurysm. REPRODUCTIVE ORGANS: Small prostate URINARY BLADDER: No mass or calculus. BONES: Lytic lesion of the right posterior first rib with a 3.6 cm soft tissue 
mass (series 3, image 7). 2.2 cm lytic lesion of the right lateral scapula 
(image 10). Lytic lesion of the left lateral fourth rib with 2.5 cm soft tissue 
mass (image 26). Destructive change in the midthoracic vertebra with pathologic 
fracture and borderline central stenosis (axial image 30). 3.5 cm soft tissue 
mass associated with right posterior rib lesion (image 37). 2.5 cm mass 
associated with pathologic fracture of a left posterior medial rib (image 54). Pathologic fracture of the left lumbar transverse process and facet (series 3, 
image 74). 5.1 cm soft tissue mass associated with a lytic spinous process mass. At the same lumbar level, 1.8 cm lytic lesion without pathologic fracture (image 84). Right posterior lamina L5  2.2 cm soft tissue mass and pathologic fracture 
(image 101). 4.6 cm lytic lesion in the right sacrum (image 106). Pathologic 
fracture through a right mid iliac or 0.1 cm lytic lesion (image 106). Lytic 
left acetabular 19 mm lesion (image 116). Lytic left sacral 2.8 cm lesion (image 113). Lytic left femoral head 19 mm lesion (image 125). Lytic right posterior 
femoral neck and trochanter 3 cm lesion without pathologic fracture (image 128). Pathologic fracture of left inferior pubic ramus by 15 mm lesion (image 134). ABDOMINAL WALL: No mass or hernia. ADDITIONAL COMMENTS: N/A Impression Left upper lobe infiltrative mass with displacement of the trachea and 
compression of the left main pulmonary artery and cut off of the left upper lobe 
bronchus compatible with primary lung malignancy. Multiple osseous metastatic foci, many of which have pathologic fractures as 
described. Of particular concern is the T5 pathologic moderate burst type 
fracture which may resultant cord compression (sagittal image 89). The right 
femoral neck is at high risk for pathologic fracture. L5 and T11 is at high risk 
for pathologic fracture. Mild compression of the T12 vertebra. Right adrenal metastatic lesion. Left pleural effusion and pericardial effusion 
may be malignant. Incidental constipation, bilateral nonobstructing renal calculi. This result was called by me at 1646 hours to Dr. Caron Dong 
[id number] Records reviewed and summarized above. Pathology report(s) reviewed above. Radiology report(s) reviewed above. Assessment/PLAN:  
 
1) presumed stage 4 lung cancer with bone mets/ possible cord compression No tissue dx yet Records and history reviewed with son at bedside. Pt sleeping and does not want to be awoken. Pt had recent clinical decline and brought to hospital.  
CTs show large lung mass with bone mets. MRIs pending. Discussed likely cancer dx with son today. Discussed need tissue dx. Discussed need MRIs for better eval of prognosis. Son thinks pt may be ok with doing biopsy for path dx. Son not sure about doing treatment. Discussed not sure of options for treatment until we have a path dx and molecular testing. Per son, pt's performance status has been poor lately. We discussed palliative care and hospice. Son states he does not think they are ready for hospice yet and want to see what options are here. Son and family are realistic regarding goal of care. Discussed not curable cancer. Goal of care palliative. Will follow for MRIs and possible biopsy. Recommend biopsy easiest site. 2)bone mets with possible cord compression. MRI pending. Start decadron. Neurosurgery and Rad/onc eval.  
If no surgery, consider radiation. 3) hypercalcemia. IVF and zometa. Likely due to cancer/ dehydration. Monitor. 4) pain due to cancer. Palliative care help with pain mgmt. 5) psychosocial.  Pt sleeping. Son at bedside. Family supportive. Pt clinically declined recently. Not sure about dispo. Palliative care seeing pt. We will follow Call if questions D/w hospitalist 
 
I appreciate the opportunity to participate in Mr. Ric Castro's care.  
 
Signed By: Kendy Mccann DO

## 2021-01-26 NOTE — PROGRESS NOTES
Attempted to see and examined this patient but was asked by the patient's visitor to come back at a later time so the patient could nap. This is no problem. I made him aware of the imaging we ordered and I will follow up once results are obtained. Dr. Erica Shepard is aware of this patient. Further treatment recommendations to follow pending MRIs and evaluation of patient. Yogi Rodas PA-C Neurosurgery/Orthopedic Spine Surgery

## 2021-01-26 NOTE — ROUTINE PROCESS
TRANSFER - OUT REPORT: 
 
Verbal report given to Dimitry [place] on Liang Snow  being transferred to 2(unit) for routine progression of care Report consisted of patients Situation, Background, Assessment and  
Recommendations(SBAR). Information from the following report(s) SBAR, Kardex, ED Summary, Intake/Output, MAR and Recent Results was reviewed with the receiving nurse. Lines:    
 
Opportunity for questions and clarification was provided. Patient transported with: 
 Registered Nurse

## 2021-01-26 NOTE — ED PROVIDER NOTES
HPI .  Patient arrived at another hospital today as an E CO with weight loss apathy and anorexia.  He was found to have a large left pleural effusion, left upper lobe mass, bone mets, T5 burst fracture, and pericardial effusion.  Oncology was consulted who reportedly called neurosurgery who recommended the patient be transferred to [facility].  Patient appears depressed and apathetic.  He has received fentanyl and Dilaudid.  He denies suicidal and homicidal ideation. 
 
Past Medical History:  
Diagnosis Date  
• Abnormal ECG 6/2/2011  
• Chronic back pain   
• Chronic cervical pain   
• Colitis   
• Dyspnea   
• Essential hypertension   
 11 yrs  
• Palpitation   
• Stone, kidney   
 
 
Past Surgical History:  
Procedure Laterality Date  
• ECHO EXERCISE STRESS  6/9/2011  
 normal, no ischemia  
 
   
Family History:  
Problem Relation Age of Onset  
• Heart Surgery Father   
     bicuspid aortic valve  
 
 
Social History  
 
Socioeconomic History  
• Marital status: SINGLE  
  Spouse name: Not on file  
• Number of children: Not on file  
• Years of education: Not on file  
• Highest education level: Not on file  
Occupational History  
• Not on file  
Social Needs  
• Financial resource strain: Not on file  
• Food insecurity  
  Worry: Not on file  
  Inability: Not on file  
• Transportation needs  
  Medical: Not on file  
  Non-medical: Not on file  
Tobacco Use  
• Smoking status: Current Some Day Smoker  
• Smokeless tobacco: Current User  
Substance and Sexual Activity  
• Alcohol use: Yes  
  Comment: 3-5 beers  
• Drug use: Not on file  
• Sexual activity: Not on file  
Lifestyle  
• Physical activity  
  Days per week: Not on file  
  Minutes per session: Not on file  
• Stress: Not on file  
Relationships  
• Social connections  
  Talks on phone: Not on file  
  Gets together: Not on file  
  Attends Spiritism service: Not on file  
  Active member of club or organization: Not on file  
 Attends meetings of clubs or organizations: Not on file Relationship status: Not on file  Intimate partner violence Fear of current or ex partner: Not on file Emotionally abused: Not on file Physically abused: Not on file Forced sexual activity: Not on file Other Topics Concern  Not on file Social History Narrative  Not on file ALLERGIES: Bactrim [sulfamethoprim ds] Review of Systems All other systems reviewed and are negative. Vitals:  
 01/25/21 1850 BP: (!) 149/125 Pulse: 91  
Resp: 12 Temp: 98.5 °F (36.9 °C) SpO2: 93% Physical Exam 
Vitals signs and nursing note reviewed. Constitutional:   
   Appearance: He is well-developed. HENT:  
   Head: Normocephalic and atraumatic. Eyes:  
   Pupils: Pupils are equal, round, and reactive to light. Neck: Musculoskeletal: Normal range of motion and neck supple. Cardiovascular:  
   Rate and Rhythm: Normal rate and regular rhythm. Heart sounds: Normal heart sounds. No murmur. No friction rub. No gallop. Pulmonary:  
   Effort: Pulmonary effort is normal. No respiratory distress. Breath sounds: No wheezing or rales. Comments: Decreased breath sounds left base Abdominal:  
   Palpations: Abdomen is soft. Tenderness: There is no abdominal tenderness. There is no rebound. Musculoskeletal: Normal range of motion. General: No tenderness. Skin: 
   Findings: No erythema. Neurological:  
   Mental Status: He is alert. Cranial Nerves: No cranial nerve deficit. Comments: Motor; symmetric; patient can lift both legs against gravity Psychiatric:  
   Comments: Apathetic/depressed MDM Procedures Perfect Serve Consult for Admission 7:14 PM 
 
ED Room Number: DW08/84 Patient Name and age:  Mal Nicholas 78 y.o.  male Working Diagnosis: 1. Closed stable burst fracture of fifth thoracic vertebra, initial encounter ([facility] [address] 2. Mass of upper lobe of left lung 3. Pleural effusion, left COVID-19 Suspicion:  no 
Sepsis present:  no  Reassessment needed: no 
Code Status:  Full Code Readmission: no 
Isolation Requirements:  no 
Recommended Level of Care:  telemetry Department:[facility] Adult ED - [phone] Other: Neurosurgery asked that the patient be sent here and consult has been placed CONSULT NOTE: 
Spoke to Dr Willa Chand concerning the patient. The patient's history, presentation, physical findings, and results were all discussed.   
8:06 PM

## 2021-01-26 NOTE — PROGRESS NOTES
2149-During and following report, this RN raised concerns to primary ED RN and nursing supervisor concerning pt BP status. Pt BP in ED elevated at 155/101 (BP taken at 2100). RN also raised concern related to remote telemetry versus bedside telemetry given pt VS, diagnosis, and CT results. RN spoke with nursing supervisor who stated RN should call the MD. 
 
RN called ED RN, David Garcia, who stated that she would verify that MD wanted remote telemetry not bedside telemetry. ED RN called RN back and placed Dr. Fernando Riddle on the phone. This RN questioned safety of placing this pt on oncology/COVID floor as well as untreated elevated BP, MD stated that he would order something for BP. Pt would remain on remote telemetry per MD and was appropriate for admission to this floor. 2229-Pt arrived to floor. Pt transferred to bed from [facility], RN began assessment with second RN. 2248-Pt BP elevated at 171/131, RN paged MD to request PRN medication. RR 10 at this time, RN will request PRN narcan as well. PT O2 sat stable at this time, pt to voice. No PRN doses noted since RN received report per [facility] 
 
2303-RN paged MD a second time, no response at this time. 46-RN paged MD for a third time, hospitalist answering service stated they would call MD directly. Answering service placed RN on hold, explained after a brief hold that Dr. Fernando Riddle wished to be perfect served at this time. RN attempted to messaged Dr. Fernando Riddle but was redirected. At this time, MD called RN back, was placed on hold by second RN. MD then hung up. 2330-MD at bedside, ordered IV narcan for pt who remained somnolent.  Pt O2 saturations in low 90s on 4L NC. 
 
 2335-RN gave 0.4mg IV Narcan from verbal order from Dr. Jessie Atiknson , pt somnolent, nodding off during conversation. MD sternal rubbed pt with no response, RN sternal rubbed pt and pt woke up for a few seconds. Pt RR at 6-8 prior to narcan. RN pushed narcan, pt RR increased and O2 sat remained stable at 95-96% on 4L NC. 
 
2345-Per MD, pt to stay on this floor on remote telemetry. Pt awake, conversational at this time. 0000-MD placed order for 10mg hydralazine IV q6h for GGY>883 or  Diastolic>110. MD instructed charge RN that if BP does not resolve or pt worsens, he will consider transfer to a higher level of care.

## 2021-01-26 NOTE — H&P
History & Physical 
 
Primary Care Provider: Alissa Correa MD 
Source of Information: Patient and chart review History of Presenting Illness:  
Logan Ruby is a 78 y.o. male past medical history of hypertension, alcohol abuse and major depression who presents to hospital with chief complaint of weight loss and decreased appetite. Patient states over the last month he feels he may have lost about 30-50 pounds unintentionally. He endorses an overall generalized malaise, fatigue and what he describes as a \"poor quality of life. \"  States he does not have readily available outpatient PCP follow-up. Additionally, he complains of severe right rib pain which he describes as intermittent, tearing and 10/10. States this has been ongoing for the last week. he was finally convinced by his family to present to ER today to seek additional care. Per chart review, family states that patient has had very little interest in his usual daily activities and was reportedly states he wanted to just lay in bed and die. He denies any recreational drug use or alcohol abuse. Admits to smoking 1-1/2 pack of cigarettes daily since his teenage years. The patient denies any fever, chills, chest pain, cough, congestion, recent illness, palpitations, or dysuria. On ER presentation, vitals remarkable for BPs 140s over 120s. Labs remarkable for leukocytosis of 17.2, creatinine 1.39, calcium 11.0 Review of Systems: A comprehensive review of systems was negative except for that written in the History of Present Illness. Past Medical History:  
Diagnosis Date  Abnormal ECG 6/2/2011  Chronic back pain  Chronic cervical pain  Colitis  Dyspnea  Essential hypertension 11 yrs  Palpitation  Stone, kidney Past Surgical History:  
Procedure Laterality Date  ECHO EXERCISE STRESS  6/9/2011  
 normal, no ischemia Prior to Admission medications Medication Sig Start Date End Date Taking? Authorizing Provider  
lisinopril-hydrochlorothiazide (PRINZIDE, ZESTORETIC) 20-12.5 mg per tablet Take 1 Tab by mouth daily. Provider, Historical  
 
Allergies Allergen Reactions  Bactrim [Sulfamethoprim Ds] Unknown (comments) Family History Problem Relation Age of Onset  Heart Surgery Father   
     bicuspid aortic valve SOCIAL HISTORY: 
Patient resides: 
Independently x Assisted Living SNF With family care Smoking history:  
None Former Chronic x Alcohol history:  
None Social x Chronic Ambulates:  
Independently x  
w/cane   
w/walker   
w/wc CODE STATUS: 
DNR Full x Other Objective:  
 
Physical Exam:  
 
Visit Vitals BP (!) 170/105 (BP 1 Location: Left arm, BP Patient Position: At rest) Pulse 85 Temp 98 °F (36.7 °C) Resp 12 SpO2 99% O2 Flow Rate (L/min): 2 l/min O2 Device: Nasal cannula General:  Alert, cooperative, no distress, appears stated age. Disheveled appearance Head:  Normocephalic, without obvious abnormality, atraumatic. Eyes:  Conjunctivae/corneas clear. PERRL, EOMs intact. Nose: Nares normal. Septum midline. Mucosa normal. No drainage or sinus tenderness. Throat: Lips, mucosa, and tongue normal. Teeth and gums normal.  
Neck: Supple, symmetrical, trachea midline, no adenopathy, thyroid: no enlargement/tenderness/nodules, no carotid bruit and no JVD. Back:    No CVA tenderness. Lungs:   Clear to auscultation bilaterally. Chest wall:  No tenderness or deformity. Heart:  Regular rate and rhythm, S1, S2 normal, no murmur, click, rub or gallop. Abdomen:   Soft, non-tender. Bowel sounds normal. No masses,  No organomegaly. Extremities: Extremities normal, atraumatic, no cyanosis or edema. Pulses: 2+ and symmetric all extremities. Skin: Skin color, texture, turgor normal. No rashes or lesions Neurologic: CNII-XII grossly intact. EKG: Normal sinus rhythm Data Review:  
 
Recent Days: 
Recent Labs  
  01/26/21 
0145 01/25/21 
1516 WBC 19.1* 17.2* HGB 11.1* 11.9*  
HCT 33.2* 35.5*  431* Recent Labs  
  01/26/21 
0145 01/25/21 
1516 * 136  
K 4.2 4.0  
 97 CO2 26 30 * 113* BUN 30* 28* CREA 1.17 1.39* CA 11.0* 11.0* ALB 2.3* 2.3* ALT 20 23 No results for input(s): PH, PCO2, PO2, HCO3, FIO2 in the last 72 hours. 24 Hour Results: 
Recent Results (from the past 24 hour(s)) EKG, 12 LEAD, INITIAL Collection Time: 01/25/21  3:10 PM  
Result Value Ref Range Ventricular Rate 96 BPM  
 Atrial Rate 96 BPM  
 P-R Interval 148 ms QRS Duration 88 ms Q-T Interval 382 ms QTC Calculation (Bezet) 482 ms Calculated P Axis 60 degrees Calculated R Axis 29 degrees Calculated T Axis 75 degrees Diagnosis Normal sinus rhythm Possible Left atrial enlargement , age undetermined Abnormal ECG No previous ECGs available Confirmed by Rafaela Graff M.D., Too [place]azo ([id number]) on 1/25/2021 5:27:15 PM 
  
TROPONIN I Collection Time: 01/25/21  3:16 PM  
Result Value Ref Range Troponin-I, Qt. <0.05 <0.05 ng/mL ETHYL ALCOHOL Collection Time: 01/25/21  3:16 PM  
Result Value Ref Range ALCOHOL(ETHYL),SERUM <10 <10 MG/DL  
CBC WITH AUTOMATED DIFF Collection Time: 01/25/21  3:16 PM  
Result Value Ref Range WBC 17.2 (H) 4.1 - 11.1 K/uL  
 RBC 4.19 4. 10 - 5.70 M/uL  
 HGB 11.9 (L) 12.1 - 17.0 g/dL HCT 35.5 (L) 36.6 - 50.3 % MCV 84.7 80.0 - 99.0 FL  
 MCH 28.4 26.0 - 34.0 PG  
 MCHC 33.5 30.0 - 36.5 g/dL  
 RDW 13.3 11.5 - 14.5 % PLATELET 595 (H) 668 - 400 K/uL MPV 9.9 8.9 - 12.9 FL  
 NRBC 0.0 0  WBC ABSOLUTE NRBC 0.00 0.00 - 0.01 K/uL NEUTROPHILS 86 (H) 32 - 75 % LYMPHOCYTES 5 (L) 12 - 49 % MONOCYTES 7 5 - 13 % EOSINOPHILS 0 0 - 7 % BASOPHILS 0 0 - 1 % IMMATURE GRANULOCYTES 2 (H) 0.0 - 0.5 % ABS. NEUTROPHILS 14.6 (H) 1.8 - 8.0 K/UL ABS. LYMPHOCYTES 0.9 0.8 - 3.5 K/UL  
 ABS. MONOCYTES 1.2 (H) 0.0 - 1.0 K/UL  
 ABS. EOSINOPHILS 0.0 0.0 - 0.4 K/UL  
 ABS. BASOPHILS 0.0 0.0 - 0.1 K/UL  
 ABS. IMM. GRANS. 0.3 (H) 0.00 - 0.04 K/UL  
 DF AUTOMATED METABOLIC PANEL, COMPREHENSIVE Collection Time: 01/25/21  3:16 PM  
Result Value Ref Range Sodium 136 136 - 145 mmol/L Potassium 4.0 3.5 - 5.1 mmol/L Chloride 97 97 - 108 mmol/L  
 CO2 30 21 - 32 mmol/L Anion gap 9 5 - 15 mmol/L Glucose 113 (H) 65 - 100 mg/dL BUN 28 (H) 6 - 20 MG/DL Creatinine 1.39 (H) 0.70 - 1.30 MG/DL  
 BUN/Creatinine ratio 20 12 - 20 GFR est AA >60 >60 ml/min/1.73m2 GFR est non-AA 51 (L) >60 ml/min/1.73m2 Calcium 11.0 (H) 8.5 - 10.1 MG/DL Bilirubin, total 1.3 (H) 0.2 - 1.0 MG/DL  
 ALT (SGPT) 23 12 - 78 U/L  
 AST (SGOT) 100 (H) 15 - 37 U/L Alk. phosphatase 102 45 - 117 U/L Protein, total 7.7 6.4 - 8.2 g/dL Albumin 2.3 (L) 3.5 - 5.0 g/dL Globulin 5.4 (H) 2.0 - 4.0 g/dL A-G Ratio 0.4 (L) 1.1 - 2.2 METABOLIC PANEL, COMPREHENSIVE Collection Time: 01/26/21  1:45 AM  
Result Value Ref Range Sodium 132 (L) 136 - 145 mmol/L Potassium 4.2 3.5 - 5.1 mmol/L Chloride 100 97 - 108 mmol/L  
 CO2 26 21 - 32 mmol/L Anion gap 6 5 - 15 mmol/L Glucose 150 (H) 65 - 100 mg/dL BUN 30 (H) 6 - 20 MG/DL Creatinine 1.17 0.70 - 1.30 MG/DL  
 BUN/Creatinine ratio 26 (H) 12 - 20 GFR est AA >60 >60 ml/min/1.73m2 GFR est non-AA >60 >60 ml/min/1.73m2 Calcium 11.0 (H) 8.5 - 10.1 MG/DL Bilirubin, total 1.1 (H) 0.2 - 1.0 MG/DL  
 ALT (SGPT) 20 12 - 78 U/L  
 AST (SGOT) 95 (H) 15 - 37 U/L Alk. phosphatase 106 45 - 117 U/L Protein, total 7.5 6.4 - 8.2 g/dL Albumin 2.3 (L) 3.5 - 5.0 g/dL Globulin 5.2 (H) 2.0 - 4.0 g/dL A-G Ratio 0.4 (L) 1.1 - 2.2    
CBC W/O DIFF Collection Time: 01/26/21  1:45 AM  
Result Value Ref Range WBC 19.1 (H) 4.1 - 11.1 K/uL  
 RBC 3.89 (L) 4.10 - 5.70 M/uL HGB 11.1 (L) 12.1 - 17.0 g/dL HCT 33.2 (L) 36.6 - 50.3 % MCV 85.3 80.0 - 99.0 FL  
 MCH 28.5 26.0 - 34.0 PG  
 MCHC 33.4 30.0 - 36.5 g/dL  
 RDW 13.4 11.5 - 14.5 % PLATELET 421 065 - 279 K/uL MPV 10.4 8.9 - 12.9 FL  
 NRBC 0.0 0  WBC ABSOLUTE NRBC 0.00 0.00 - 0.01 K/uL Imaging:  
 
Assessment:  
 
Tigre Jensen is a 78 y.o. male past medical history of hypertension, alcohol abuse and major depression who is admitted for newly diagnosed metastatic lung cancer. Plan:  
 
 
Weight loss/apathy/anorexia 
-Likely secondary to metastatic disease 
-Consult nutrition for dietary supplementation Left upper lobe lung mass Metastatic lung cancer Rib pain 
-Caution with opiate analgesics 
-Patient initially treated with Dilaudid on admission but required Narcan reversal 
-Treat pain with Toradol as needed 
-Currently without any complaints of pain. 
-Heme-onc on consult. Appreciate recs Closed stable burst fracture of fifth thoracic vertebra 
-Likely sequelae of metastatic lung cancer 
-Decadron x1 dose 
-Keep n.p.o. 
-Heme-onc and neurosurgery to follow Left pleural effusion 
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 
-No acute intervention Hypertension 
-Resume home Prinzide 
-As needed hydralazine with parameters Hypercalcemia of Malignancy 
-Continue MIVF 
-Trend with daily CMPs FEN/GI -  NS @ 100ml/hr Activity - as tolerated DVT prophylaxis - Lovenox GI prophylaxis -  NI Disposition - TBD 
 
CODE STATUS:  Full code Signed By: Chiquita Villavicencio MD   
 January 26, 2021

## 2021-01-26 NOTE — PROGRESS NOTES
Occupational Therapy 01/26/21 Orders received, chart reviewed and patient evaluated by occupational therapy. Pending progression with skilled acute occupational therapy, recommend: 
Therapy up to 5 days/week in SNF setting Recommend with nursing patient to complete as able in order to maintain strength, endurance and independence: OOB to chair 3x/day and functional mobility to the [facility] with SPC use. Thank you for your assistance. Full evaluation to follow. Thank you, CATHLEEN Santana, OTR/L

## 2021-01-27 NOTE — PROGRESS NOTES
Problem: Dysphagia (Adult) Goal: *Acute Goals and Plan of Care (Insert Text) Description: Speech Therapy Goals Initiated 1/26/2021 1. Patient will tolerate minced/moist diet and thin liquids without adverse effects within 7 days. Advance to regular diet 1/27/2021 2. Patient will participate in a Fiberoptic Endoscopic Evaluation of Swallow (FEES) at bedside within 3 days. Met 1/27/2021 Outcome: Progressing Towards Goal 
  
Speech Language Pathology Fiberoptic Endoscopic Evaluation of Swallowing-FEES Patient: Trini Rankin (30 y.o. male) Date: 1/27/2021 Primary Diagnosis: Metastatic lung [facility] (metastasis from lung to other site[facility] [C34.90] Precautions:  Fall ASSESSMENT : 
Based on the objective data described below, the patient presents with functional oropharyngeal phases of swallow without any overt difficulty nor aspiration, penetration, nor pharyngeal residue appreciated. From a swallow function standpoint, pt cleared for regular diet/thin liquids as outlined below. However, two incidental findings noted. First noted was left true vocal fold hymobility which could be indicative of recurrent laryngeal nerve impingement from lung tumor. Despite this, pt's R true vocal fold compensates and pt able to gain glottic closure. However, if lung mass determined to be the cause, would not anticipate improvement in voicing until lung mass addressed. Additionally, pt noted to have a purple-red protuberance to the R of the BOT which is of some concern. Hospitalist, oncologist, and palliative NP all notified of results. This could be the etiology for pt's complaint of a lump in his throat. SLP will follow-up x1 for further education. Patient will benefit from skilled intervention to address the above impairments. Patient's rehabilitation potential is considered to be Guarded Pictured: Purple-red protuberance to the R of BOT of some concern. Hospitalist, oncologist, and palliative NP notified. PLAN : 
Recommendations and Planned Interventions: 
--Regular diet/thin liquids 
--Further work-up for protuberance to the R of the BOT Frequency/Duration: Patient will be followed by speech-language pathology x1 visit to address goals. Discharge Recommendations: To Be Determined SUBJECTIVE:  
Patient stated, \"Why did I have to do this test again? . OBJECTIVE:  
 
Past Medical History:  
Diagnosis Date Abnormal ECG 6/2/2011 Chronic back pain Chronic cervical pain Colitis Dyspnea Essential hypertension 11 yrs Palpitation Stone, kidney Past Surgical History:  
Procedure Laterality Date ECHO EXERCISE STRESS  6/9/2011  
 normal, no ischemia Prior Level of Function/Home Situation:  
Home Situation Home Environment: Private residence # Steps to Enter: 3 Rails to Enter: Yes One/Two Story Residence: Two story Living Alone: Yes Current DME Used/Available at Home: Cane, straight Tub or Shower Type: Tub/Shower combination Diet prior to admission: Regular diet/thin liquids Current Diet:  Mechanically-altered ground diet/thin liquids Patient Position: reclined in bed Scope used: [id number] Respiratory status: 2 LPM via NC Part I:   Anatomic-Physiologic Assessment: 
Oral Assessment Labial: No impairment Dentition: Natural 
Oral Hygiene: oral mucosa dry Lingual: No impairment Velum: No impairment Mandible: No impairment  Movement:  [facility] Base of Tongue Movement:  Unable to assess; protuberance as noted above Secretions:  n/a Appearance of Secretions:  n/a Effectiveness of Swallow in Clearing Secretions:  n/a 
VF Symmetry and Appearance: Left true vocal fold hypomobility Phonation:  Aphonic Other Structural Remarks:  Protuberance as shown above Trial 1:  
Consistency Presented: Thin liquid;Puree; Solid How Presented: Successive swallows;Straw;Spoon(Rehab tech fed) Bolus Acceptance: No impairment Bolus Formation/Control: No impairment:    
Propulsion: No impairment Oral Residue: None Initiation of Swallow: Triggered at vallecula Timing: No impairment Penetration: None Aspiration/Timing: No evidence of aspiration Pharyngeal Clearance: No residue Decreased Tongue Base Retraction?: (unable to assess) Laryngeal Elevation: WFL (within functional limits) Aspiration/Penetration Score: 1 (No penetration or aspiration-Contrast does not enter the airway) Pharyngeal Symmetry: Symmetrical 
Pharyngeal-Esophageal Segment: No impairment Pharyngeal Dysfunction: None Oral Phase Severity: No impairment Pharyngeal Phase Severity: N/A 
NOMS:  
The NOMS functional outcome measure was used to quantify this patient's level of swallowing impairment. Based on the NOMS, the patient was determined to be at level 7 for swallow function NOMS Swallowing Levels: 
Level 1 (CN): NPO Level 2 (CM): NPO but takes consistency in therapy Level 3 (CL): Takes less than 50% of nutrition p.o. and continues with nonoral feedings; and/or safe with mod cues; and/or max diet restriction Level 4 (CK): Safe swallow but needs mod cues; and/or mod diet restriction; and/or still requires some nonoral feeding/supplements Level 5 (CJ): Safe swallow with min diet restriction; and/or needs min cues Level 6 (CI): Independent with p.o.; rare cues; usually self cues; may need to avoid some foods or needs extra time Level 7 (CH): Independent for all p.o. SANDRA. (2003). National Outcomes Measurement System (NOMS): Adult Speech-Language Pathology User's Guide. COMMUNICATION/EDUCATION:  
 
The patient's plan of care including findings from FEES, recommendations, planned interventions, and recommended diet changes were discussed with: Registered nurse, Physician, and NP . Patient is unable to participate in goal setting and plan of care. Pt's daughter present and items explained. Thank you for this referral. 
JOSIANE Goldberg Time Calculation: 60 mins

## 2021-01-27 NOTE — PROGRESS NOTES
Hospitalist Progress Note NAME: Mal Nicholas :  1950 MRN:  [id number] Assessment / Plan: 
Weight loss/apathy/anorexia: Severe protein calorie malnutrition 
-Likely secondary to metastatic disease 
-Consulted nutrition for dietary supplementation 
 
  
Left upper lobe lung mass Metastatic lung cancer Rib pain 
-Caution with opiate analgesics: Will start on hydromorphone 0.5 mg every 4 hours 
-Patient initially treated with Dilaudid on admission but required Narcan reversal 
-Treat pain with Toradol as needed -Reports severe pain 9/10 
-Heme-onc on consult. Appreciate recs, consult IR for biopsy. Oncology following. Neurosurgery and palliative care will follow Closed stable burst fracture of fifth thoracic vertebra 
-Likely sequelae of metastatic lung cancer 
-Decadron x1 dose. -Heme-onc and neurosurgery to follow 
  Left pleural effusion 
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 
  
Bilateral nonobstructing renal calculi 
-No acute intervention 
  
Hypertension: With hypertensive urgency, due to essential hypertension and severe pain 
-Resume home Prinzide 
-Continue current medication. Increase clonidine 
  
Hypercalcemia of Malignancy 
-Continue IV fluid, Zometa given. Daily lab. 
-Daily lab 
  
  
  
  
FEN/GI  hold IV fluid due to hypertension Activity - as tolerated DVT prophylaxis - Lovenox GI prophylaxis -  NI Disposition - TBD 
  
CODE STATUS:  Full code Son at bedside updated Subjective: Chief Complaint / Reason for Physician Visit \"**Severe chest pain\". Discussed with RN events overnight. Review of Systems: 
Symptom Y/N Comments  Symptom Y/N Comments Fever/Chills    Chest Pain Poor Appetite    Edema Cough    Abdominal Pain Sputum    Joint Pain SOB/DYER    Pruritis/Rash Nausea/vomit    Tolerating PT/OT Diarrhea    Tolerating Diet Constipation    Other Could NOT obtain due to:   
 
Objective: VITALS:  
 Last 24hrs VS reviewed since prior progress note. Most recent are: 
Patient Vitals for the past 24 hrs: 
 Temp Pulse Resp BP SpO2  
01/27/21 1442 97.9 °F (36.6 °C) 86 16 134/87 99 % 01/27/21 0817 97.7 °F (36.5 °C) 84 18 125/85 98 % 01/27/21 0250 97.6 °F (36.4 °C) 85 18 (!) 143/90 94 % 01/26/21 2141 97.5 °F (36.4 °C) 85 16 (!) 151/85 95 % Intake/Output Summary (Last 24 hours) at 1/27/2021 1512 Last data filed at 1/27/2021 1206 Gross per 24 hour Intake  Output 125 ml Net -125 ml I had a face to face encounter and independently examined this patient on 1/27/2021, as outlined below: PHYSICAL EXAM: 
General: WD, WN. Alert, cooperative, no acute distress, ill looking patient EENT:  EOMI. Anicteric sclerae. MMM Resp:  CTA bilaterally, no wheezing or rales. No accessory muscle use CV:  Regular  rhythm,  No edema GI:  Soft, Non distended, Non tender. +Bowel sounds Neurologic:  Alert and oriented , normal speech, Psych:    Not anxious nor agitated Skin:  No rashes. No jaundice Reviewed most current lab test results and cultures  YES Reviewed most current radiology test results   YES Review and summation of old records today    NO Reviewed patient's current orders and MAR    YES 
PMH/ reviewed - no change compared to H&P 
________________________________________________________________________ Care Plan discussed with: 
  Comments Patient Family RN Care Manager Consultant Multidiciplinary team rounds were held today with , nursing, pharmacist and clinical coordinator. Patient's plan of care was discussed; medications were reviewed and discharge planning was addressed. ________________________________________________________________________ Total NON critical care TIME:  35  Minutes Total CRITICAL CARE TIME Spent:   Minutes non procedure based Comments >50% of visit spent in counseling and coordination of care    
________________________________________________________________________ Hernán Del Rosario MD  
 
Procedures: see electronic medical records for all procedures/Xrays and details which were not copied into this note but were reviewed prior to creation of Plan. LABS: 
I reviewed today's most current labs and imaging studies. Pertinent labs include: 
Recent Labs  
  01/26/21 
0145 01/25/21 
1516 WBC 19.1* 17.2* HGB 11.1* 11.9*  
HCT 33.2* 35.5*  431* Recent Labs  
  01/27/21 
0020 01/26/21 
0145 01/25/21 
1516 * 132* 136  
K 3.8 4.2 4.0  
 100 97 CO2 25 26 30 * 150* 113* BUN 29* 30* 28* CREA 1.10 1.17 1.39* CA 11.1* 11.1*  11.0* 11.0* ALB  --  2.3* 2.3* TBILI  --  1.1* 1.3* ALT  --  20 23 Signed: Hernán Del Rosario MD

## 2021-01-27 NOTE — PROGRESS NOTES
Spine Surgery Progress Note Bonita Govea PA-C Admit Date: 2021 LOS: 2 days Daily Progress Note: 2021 Reason for consult: T5 burst fx with newly diagnosed lung cancer Subjective:  
 
Mr. Flor Ferris is a 78year old gentleman with a PMH of HTN and depression who presented to the [facility] [place] ED on 21. He was brought to the ED by police with generalized malaise, apathy, decreased appetite, weight loss, and chest pain. According to family he has deteriorated significantly since [place]. He is typically very neat but his house was found to be very messy by his daughter recently. Patient is a smoker. A CT revealed a large pleural effusion, large left upper lobe mass, pericardial effusion, and T5 burst fracture. Patient was then transferred to [facility] for oncology workup. Patient states his back is painful but he denies radiating symptoms. He denies any change to sensation or mobility in his legs. He denies changes to bowel and bladder function. Oncology and hospitalist are following this patient for suspected lung cancer. At present, patient seems sleepy and is very soft spoken. His daughter is at the bedside. Patient is lying comfortably in bed. Objective:  
 
Vital signs VSS Afebrile. Temp (24hrs), Av.7 °F (36.5 °C), Min:97.5 °F (36.4 °C), Max:97.9 °F (36.6 °C) 
  07 - 1900 In: -  
Out: 125 [Urine:125]  1901 -  07 In: -  
Out: 200 [Urine:200] Visit Vitals /87 (BP 1 Location: Left arm, BP Patient Position: At rest) Pulse 86 Temp 97.9 °F (36.6 °C) Resp 16 SpO2 99% O2 Flow Rate (L/min): 2 l/min O2 Device: Nasal cannula Output (mL) Urine Voided: 125 ml (21 1206) Last Bowel Movement Date: (Unknown) (21 0810) Unmeasurable Output Urine Occurrence(s): 1 (21 0630) Pain control Pain Assessment Pain Scale 1: Numeric (0 - 10) Pain Intensity 1: 8 Pain Onset 1: Acute Pain Location 1: Chest 
Pain Orientation 1: Right Pain Description 1: Aching Pain Intervention(s) 1: Medication (see MAR) PT/OT Gait     Gait Base of Support: Narrowed Speed/Tish: Delayed, Pace decreased (<100 feet/min) Step Length: Left shortened, Right shortened Gait Abnormalities: Decreased step clearance, Path deviations, Trunk sway increased Ambulation - Level of Assistance: Assist x1, Minimal assistance Distance (ft): 12 Feet (ft) Assistive Device: Gait belt, Other (comment)(hand hold assist) Physical Exam: 
Gen: No acute distress. Ill-appearing. Neuro: A&Ox3. Follows commands. Speech quiet. SPRINGER spontaneously. Wiggles toes. Sensation intact. Babinski reflex absent. No clonus. Gait deferred. Imaging MRI [facility] SPINE 1/26/21 IMPRESSION 1. Extensive osseous metastatic disease involves multiple thoracic vertebral 
bodies, multiple ribs, and sternal manubrium. 2. Pathologic fracture of the T6 vertebral body with bony retropulsion, severe 
central spinal canal stenosis, and spinal cord compression. No definable cord 
contusion at this time. 3. Multilevel bilateral foraminal stenoses from multiple rib metastatic lesions. MRI LUMB SPINE 1/26/21 IMPRESSION 1. Extensive osseous metastatic disease in the lumbar spine, sacrum, and 
bilateral iliac bones. Expansile metastatic lesion in the L3 spinous process 
measures 3.4 cm. Findings are new since 2010, unchanged since one day ago. 2. No pathologic fracture. Chronic osteoporotic partial compression fracture of 
the L1 vertebral body is new since 2010. 3. Normal distal spinal cord. 4. L5-S1 moderate central spinal canal stenosis is not significantly changed 
since 2010. MRI CERV SPINE 1/26/21 IMPRESSION 1. Bone replacing lesions in the left aspect of C1 vertebral body and within T1 
and T2 vertebral bodies are compatible with metastatic disease. Possible 
metastatic lesion in the right aspect of the C5 vertebral body. No pathologic 
fracture.  
2. Normal cervical spinal cord. 3. Multilevel stenoses are described above. MRI BRAIN 1/26/21 IMPRESSION 1. Enhancing lesion anterior left parietal bone suspicious for osseous 
metastasis. No parenchymal metastases. 2. Mild chronic white matter disease and small focus of acute infarction 
anterior right frontal lobe.   
 
24 hour results: 
 
Recent Results (from the past 24 hour(s)) METABOLIC PANEL, BASIC Collection Time: 01/27/21 12:20 AM  
Result Value Ref Range Sodium 134 (L) 136 - 145 mmol/L Potassium 3.8 3.5 - 5.1 mmol/L Chloride 100 97 - 108 mmol/L  
 CO2 25 21 - 32 mmol/L Anion gap 9 5 - 15 mmol/L Glucose 137 (H) 65 - 100 mg/dL BUN 29 (H) 6 - 20 MG/DL Creatinine 1.10 0.70 - 1.30 MG/DL  
 BUN/Creatinine ratio 26 (H) 12 - 20 GFR est AA >60 >60 ml/min/1.73m2 GFR est non-AA >60 >60 ml/min/1.73m2 Calcium 11.1 (H) 8.5 - 10.1 MG/DL Assessment:  
 
Active Problems: Metastatic lung cancer (metastasis from lung to other site) [facility]) (1/25/2021) Goals of care, counseling/discussion () Right-sided chest pain () Anorexia () Hypoalbuminemia () Plan:  
 
1) Pathologic fracture of T6 with spinal canal stenosis -Extensive bony metastatic disease throughout spine and ribs 
-No neurologic deficits noted on exam 
-Risks of surgery outweigh benefits at this time 
-Oncology for radiation/chemotherapy/palliative care 
  -Pain control 
 -Activities as tolerated 
 -Happy to re-evaluate as things progress if appropriate 2) Presumed stage 4 lung cancer 
 -Tissue bx and dx pending 
 -IR consulted 
 -Palliative consulted 3) Hypercalcemia 
 -due to #1,2 
 -Hospitalist/heme onc following 4) HTN 
 -Daily HCTZ and lisinopril Plan d/w Dr. Cassie Rizzo, PA

## 2021-01-27 NOTE — PROGRESS NOTES
RISSA: 
 
RUR 15% Patient currently lives alone. Family does not feel that it is safe for him to return home at discharge. Disposition: TBD. Therapy recommending SNF. Primary contact: Domonique Bland [phone] Care Management Interventions PCP Verified by CM: Yes Mode of Transport at Discharge: (TBD) MyChart Signup: No 
Discharge Durable Medical Equipment: No 
Physical Therapy Consult: Yes Occupational Therapy Consult: Yes Speech Therapy Consult: No 
Current Support Network: Lives Alone Confirm Follow Up Transport: Family Discharge Location Discharge Placement: (TBD) Reason for Admission:   Weight loss and decreased appetite. RUR Score: 15% Plan for utilizing home health:  No orders. Therapy recommending SNF. PCP: First and Last name:  Son November Name of Practice:  
 Are you a current patient: Yes/No:  
 Approximate date of last visit: Unknown Can you participate in a virtual visit with your PCP: Unknown Current Advanced Directive/Advance Care Plan:  
                     none Transition of Care Plan:                   
 
CM spoke with patient's daughter, Domonique Bland [phone] to introduce CM role, verify demographics and begin discharge planning. Patient currently lives alone in a house that he owns. He has 3 children who all live out of state. His daughter stated that he does have friends in [place], however he has not been leaving his house lately. Patient gets prescriptions filled at [facility]. Patient's daughter stated that he does not use any DME. Tianna Kim stated that if necessary, she could stay with her father temporarily. She asked CM about applying for Medicaid. CM to send referral to [address] [place] [address]  verified: [place] Medicare A&B Julieta Nugent RN/JOSEP 
[phone]

## 2021-01-27 NOTE — PROGRESS NOTES
Full Note to follow. Widely metastatic presumed lung cancer, newly diagnosed. Poor prognosis and poor performance status. No pathology yet. No clinical e/o cord compression and no signal abnormality on MRI. Dr. Fred Castanon has reviewed/evaluated and does not see role for surgical intervention at this time per his notes. Typical options would then include radiation or palliative care alone. His overall status is poor. Could consider biopsy but I do not think he is a great candidate for palliative radiation given it would be treatment every x 5-10 days, laying flat immobilized and of uncertain benefit given he is unlikely to receive systemic therapy and is not displaying any s/s of clinical cord compression. XRT could be used for pain but would likely be better with opioids/medical management given the diffuse nature of his disease. He does note central chest discomfort as his primary pain so could consider XRT to the lung mass if he was able to tolerate treatments. I had a discussion with the daughter Letty Berger about this but told her we would not do anything emergently until goals of care discussion was had. Will continue to follow and happy to be part of discussions with family/team.  
 
Thank you for the consultation and opportunity to participate in his care. Miguel Driver MD 
Radiation Oncology [phone]

## 2021-01-27 NOTE — CONSULTS
[place] Name:  Winter Zaidi 
MR#:  [id number] :  1950 ACCOUNT #:  [de-identified] DATE OF SERVICE:  2021 CHIEF COMPLAINT:  Metastatic lesions to the spine with cord compression at the T5-6 level. HISTORY OF PRESENT ILLNESS:  A 63-year-old man with a heretofore undiagnosed cancer that appears to be lung cancer with a large lung mass and multiple metastatic lesions to the spine. The patient presented with chest pain, anterior chest wall, mostly left sided with a T5 or T6 pathologic lesion in the vertebral body causing mild cord compression. He has a history of hypertension, alcohol abuse, major depression, weight loss of 30-50 pounds over the last month, and has been getting progressively worse. Complains of right rib pain, but for me, he pointed to the left side of his chest.  Smoking history one and a half packs of cigarettes since his teen years. REVIEW OF SYSTEMS:  No other GI, , respiratory, cardiac, neurologic, or psychiatric complaints. PAST MEDICAL HISTORY: 
1. Colitis. 2.  Chronic neck pain. 3.  Chronic back pain. 4.  Essential hypertension. 5.  Palpitations. MEDICATIONS:  Lisinopril. ALLERGIES:  BACTRIM. FAMILY HISTORY:  Unrelated. SOCIAL HISTORY:  Smokes and heavy alcohol use. PHYSICAL EXAMINATION: 
GENERAL:  He is awake, clearly debilitated, speaks in a very soft voice. VITAL SIGNS:  Recent temperature 97.9, blood pressure 134/87, respiratory rate 16, and pulse rate 86. NEUROLOGIC:  Moves all four extremities equally. Does not complain of any extremity pain. Complains only of some minor back pain. Toes are downgoing. No clonus at the ankles. Gait and station are not tested. Cerebellar coordination, finger-to-nose testing are normal.  Cranial nerve function II-XII normal. 
 
Imaging studies include an MRI of the thoracic spine that shows an expansile lesion at about T5 with some cord compression.   However, the patient does not exhibit much in the way of any cord compression in terms of neurologic signs or symptoms and appears quite debilitated. I have read the notes of Dr. Hubbard Early from Oncology who recommends palliative care. I had a long discussion with the patient and the patient's daughter who was in the room noting that palliative care certainly is a very good option in this case. He does not appear to be a good candidate for any surgical intervention. I would like to see him a lot stronger before we took him to an operation that would give him a couple of months of recovery at the very least that would be necessary. There also is no acute need for intervention for neurosurgical treatment, so there is no emergency at this point. I would be happy to follow and reevaluate on request.  Otherwise, I will defer care to the primary medical team and the Oncology team. 
 
Thank you for asking me to see the patient. Bri Ly MD 
 
 
[id number] 
D:  01/27/2021 16:18 
T:  01/27/2021 17:53 
JOB #:  [id number] CC:  Gt Fitzpatrick MD

## 2021-01-27 NOTE — PROGRESS NOTES
Full note dictated  Pt is quite debilitated and no acute neuro deficits. Therefore, no acute neurosurgical needs or surgery required agree palliative care may be best approach  Will re evaluate on request

## 2021-01-27 NOTE — PROGRESS NOTES
Problem: Self Care Deficits Care Plan (Adult) Goal: *Acute Goals and Plan of Care (Insert Text) Description:  
FUNCTIONAL STATUS PRIOR TO ADMISSION: Patient is a poor historian, confused (A&O x2), reports being IND prior to admission. HOME SUPPORT: Patient reports he lives alone, poor historian. Occupational Therapy Goals Initiated 1/26/2021 1. Patient will perform grooming with supervision/set-up within 7 day(s). 2.  Patient will perform bathing with supervision/set-up within 7 day(s). 3.  Patient will perform lower body dressing with supervision/set-up within 7 day(s). 4.  Patient will perform toilet transfers with supervision/set-up within 7 day(s). 5.  Patient will perform all aspects of toileting with minimal assistance/contact guard assist within 7 day(s). 6.  Patient will participate in upper extremity therapeutic exercise/activities with supervision/set-up for 5 minutes within 7 day(s). 7.  Patient will utilize energy conservation techniques during functional activities with verbal and visual cues within 7 day(s). Outcome: Progressing Towards Goal 
 
OCCUPATIONAL THERAPY TREATMENT Patient: Natty Ascencio (18 y.o. male) Date: 1/27/2021 Diagnosis: Metastatic lung cancer (metastasis from lung to other site) [facility]) [C34.90] <principal problem not specified> Precautions: Fall Chart, occupational therapy assessment, plan of care, and goals were reviewed. ASSESSMENT Patient continues with skilled OT services and is progressing towards goals. Patient continues to be limited by decreased balance, safety awareness, activity tolerance, strength, coordination, BLE ROM, cognition, and safety awareness s/p admission for AMS, found to have metastatic lung CA. Required standby A for simple mobility tasks and min A for UE and LE bathing EOB. Patient required  supine rest break 2/2 increased SOB w/ minimal seated EOB activity this session.  Patient refusing further activity, educated on importance of upright positioning and intermittent activity w/ daughter present. Continue to recommend SNF post d/c. Current Level of Function Impacting Discharge (ADLs): Up to min A for limited mobility w/ SPC and min-mod A for ADLs Other factors to consider for discharge: fall risk, CA w/ mets PLAN : 
Patient continues to benefit from skilled intervention to address the above impairments. Continue treatment per established plan of care. to address goals. Recommend with staff: Recommend with nursing, ADLs with min-mod A, OOB to chair 3x/day and toileting via beside commode w/ use of SPC and x1 assist. Thank you for completing as able in order to maintain patient strength, endurance and independence. Recommendation for discharge: (in order for the patient to meet his/her long term goals) Therapy up to 5 days/week in SNF setting If d.c home, recommend HHOT and family assist x1 for all ADLs/IADLs/mobility w/ DME below This discharge recommendation: 
Has been made in collaboration with the attending provider and/or case management IF patient discharges home will need the following DME: BSC, ?RW, shower chair pending endurance SUBJECTIVE:  
Patient stated I said no.  patient politely refused further activity OBJECTIVE DATA SUMMARY:  
Cognitive/Behavioral Status: 
Neurologic State: Alert Orientation Level: Oriented to person;Oriented to situation;Disoriented to time;Disoriented to place Cognition: Follows commands;Memory loss;Poor safety awareness Functional Mobility and Transfers for ADLs: 
Bed Mobility: 
Supine to Sit: Stand-by assistance Sit to Supine: Stand-by assistance Transfers: 
  
  
  
 
Balance: 
Sitting: Intact; Without support ADL Intervention: 
Upper Body Bathing Bathing Assistance: Minimum assistance Position Performed: Seated edge of bed Cues: Verbal cues provided Lower Body Bathing Bathing Assistance: Minimum assistance Position Performed: (Seated EOB) Lower Body : Minimum assistance Position Performed: Seated edge of bed Cues: Verbal cues provided Pain: 
None noted Activity Tolerance:  
Poor After treatment patient left in no apparent distress:  
Patient positioned in bed,  sidelying for pressure relief, Call bell within reach, Bed / chair alarm activated, Caregiver / family present, and Side rails x 3 
 
COMMUNICATION/COLLABORATION:  
The patients plan of care was discussed with: Registered nurseSal Gambino Time Calculation: 21 mins Regarding student involvement in patient care: A student participated in this treatment session. Per CMS Medicare statements and AOTA guidelines I certify that the following was true: 1. I was present and directly observed the entire session. 2. I made all skilled judgments and clinical decisions regarding care. 3. I am the practitioner responsible for assessment, treatment, and documentation.

## 2021-01-27 NOTE — PROGRESS NOTES
SLP Contact Note FEES complete. Pt without any aspiration nor penetration. 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 Furthermore, noted purple protuberance on pt's right around the base of tongue, which feel would benefit from further work-up. Will contact MD.  Full note to follow. Thank you, Boris Figueroa M.Ed, CCC-SLP Speech-Language Pathologist

## 2021-01-27 NOTE — ACP (ADVANCE CARE PLANNING)
Visited pt in response to an In-Basket request to assist with an Advance Medical Directive (AMD). Palliative Medicine NP Augustine Penaloza is working with the pt and is pursuing an AMD.  
Chaplain Wyman MDiv, MS, [facility] 
[phone]

## 2021-01-27 NOTE — PROGRESS NOTES
[facility] [place] [facility] [place] [address] Geovanna Beal 021, [address] W: [phone]  F: [phone] Reason for Visit:  
Emilee Monson is a 78 y.o. male who is seen in consultation fu at the request of Dr. Hung Quiros for evaluation of lung mass. Treatment History:  
None yet History of Present Illness:  
 
Pt seen today for hospital consult for new lung mass. No tissue dx of cancer. Pt transferred here due to possible cord compression on CT. Pt is for MRI spine and brain. Pt clinically declined recently and brought to hospital by police. Son at bedside. Pt is sleeping at my visit. Son says he cannot give much history. Pain in chest per son. Not eating well. INTERIM HX:  Pt seen today for fu of new lung mass/ bone mets. Pt is in bed c/o pain. palliative care seeing pt. Daughter at bedside. MRI done and brain negative/ spine with diffuse mets/ cord compression at T6. Neurosurgery to see pt. Speech seeing pt. No tissue dx yet. Palliative care to see pt. Pt not conversant but wakes up. Past Medical History:  
Diagnosis Date  Abnormal ECG 6/2/2011  Chronic back pain  Chronic cervical pain  Colitis  Dyspnea  Essential hypertension 11 yrs  Palpitation  Stone, kidney Past Surgical History:  
Procedure Laterality Date  ECHO EXERCISE STRESS  6/9/2011  
 normal, no ischemia Social History Tobacco Use  Smoking status: Current Some Day Smoker  Smokeless tobacco: Current User Substance Use Topics  Alcohol use: Yes Comment: 3-5 beers Family History Problem Relation Age of Onset  Heart Surgery Father   
     bicuspid aortic valve Current Facility-Administered Medications Medication Dose Route Frequency  HYDROmorphone (DILAUDID) tablet 1 mg  1 mg Oral Q6H PRN  
 HYDROmorphone (PF) (DILAUDID) injection 0.5 mg  0.5 mg IntraVENous Q3H PRN  
  hydrALAZINE (APRESOLINE) 20 mg/mL injection 10 mg  10 mg IntraVENous Q6H PRN  
 lisinopriL (PRINIVIL, ZESTRIL) tablet 20 mg  20 mg Oral DAILY  hydroCHLOROthiazide (HYDRODIURIL) tablet 12.5 mg  12.5 mg Oral DAILY  lidocaine (XYLOCAINE) 2 % jelly   Mucous Membrane PRN  
 cloNIDine (CATAPRES) 0.2 mg/24 hr patch 1 Patch  1 Patch TransDERmal Q7D  
 famotidine (PF) (PEPCID) 20 mg in 0.9% sodium chloride 10 mL injection  20 mg IntraVENous Q12H  
 dexamethasone (DECADRON) 4 mg/mL injection 4 mg  4 mg IntraVENous Q6H  
 sodium chloride (NS) flush 5-40 mL  5-40 mL IntraVENous Q8H  
 sodium chloride (NS) flush 5-40 mL  5-40 mL IntraVENous PRN  
 acetaminophen (TYLENOL) tablet 650 mg  650 mg Oral Q6H PRN Or  
 acetaminophen (TYLENOL) suppository 650 mg  650 mg Rectal Q6H PRN  polyethylene glycol (MIRALAX) packet 17 g  17 g Oral DAILY PRN  promethazine (PHENERGAN) tablet 12.5 mg  12.5 mg Oral Q6H PRN Or  
 ondansetron (ZOFRAN) injection 4 mg  4 mg IntraVENous Q6H PRN  
 enoxaparin (LOVENOX) injection 40 mg  40 mg SubCUTAneous DAILY  0.9% sodium chloride infusion  100 mL/hr IntraVENous CONTINUOUS Allergies Allergen Reactions  Bactrim [Sulfamethoprim Ds] Unknown (comments) Review of Systems: A complete review of systems was obtained, negative except as described above. Physical Exam:  
 
Visit Vitals /87 (BP 1 Location: Left arm, BP Patient Position: At rest) Pulse 86 Temp 97.9 °F (36.6 °C) Resp 16 SpO2 99% ECOG PS: 2 General: No distress awake Eyes: anicteric sclerae HENT: Atraumatic Neck: Supple Respiratory: normal respiratory effort MS: in bed, general weakness Skin: No rashes, ecchymoses, or petechiae. Normal temperature, turgor, and texture. Psych: minimally conversant Results:  
 
Lab Results Component Value Date/Time  WBC 19.1 (H) 01/26/2021 01:45 AM  
 HGB 11.1 (L) 01/26/2021 01:45 AM  
 HCT 33.2 (L) 01/26/2021 01:45 AM  
  PLATELET 369 01/26/2021 01:45 AM  
 MCV 85.3 01/26/2021 01:45 AM  
 ABS. NEUTROPHILS 14.6 (H) 01/25/2021 03:16 PM  
 
Lab Results  
Component Value Date/Time  
 Sodium 134 (L) 01/27/2021 12:20 AM  
 Potassium 3.8 01/27/2021 12:20 AM  
 Chloride 100 01/27/2021 12:20 AM  
 CO2 25 01/27/2021 12:20 AM  
 Glucose 137 (H) 01/27/2021 12:20 AM  
 BUN 29 (H) 01/27/2021 12:20 AM  
 Creatinine 1.10 01/27/2021 12:20 AM  
 GFR est AA >60 01/27/2021 12:20 AM  
 GFR est non-AA >60 01/27/2021 12:20 AM  
 Calcium 11.1 (H) 01/27/2021 12:20 AM  
 
Lab Results  
Component Value Date/Time  
 Bilirubin, total 1.1 (H) 01/26/2021 01:45 AM  
 ALT (SGPT) 20 01/26/2021 01:45 AM  
 Alk. phosphatase 106 01/26/2021 01:45 AM  
 Protein, total 7.5 01/26/2021 01:45 AM  
 Albumin 2.3 (L) 01/26/2021 01:45 AM  
 Globulin 5.2 (H) 01/26/2021 01:45 AM  
 
CT Results (most recent): 
Results from Hospital Encounter encounter on 01/25/21  
CT CHEST ABD PELV W CONT  
 Narrative EXAM: CT CHEST ABD PELV W CONT 
 
INDICATION: large left pleural effusion, assess for mass vs metastatic disease 
 
COMPARISON: Plain radiograph 
 
CONTRAST: 100 mL of Isovue-370. 
 
TECHNIQUE:  
Following the uneventful intravenous administration of contrast, thin axial 
images were obtained through the chest, abdomen and pelvis. Coronal and sagittal 
reformats were generated. Oral contrast was administered. CT dose reduction was 
achieved through use of a standardized protocol tailored for this examination 
and automatic exposure control for dose modulation. 
 
FINDINGS:  
 
CHEST WALL: No mass or axillary lymphadenopathy. 
THYROID: No nodule. 
MEDIASTINUM: No mass or lymphadenopathy. 
CHANI: No mass or lymphadenopathy. 
THORACIC AORTA: No dissection or aneurysm. 
MAIN PULMONARY ARTERY: Left main pulmonary artery is circumferentially 
compressed by the left upper lobe mass that measures 7.4 x 11 x 8.7 cm. (Axial 
 image 28 and coronal image 48). There is lateral displacement of the main 
pulmonary trunk by this mass. There is posterior compression of the left 
mainstem bronchus and truncation by this mass (image 29). Left lower lobe 
subsegmental atelectasis. The trachea is displaced to the right. TRACHEA/BRONCHI: Patent. ESOPHAGUS: No wall thickening or dilatation. HEART: Normal in size. Small pericardial effusion. PLEURA: Large left pleural effusion with density measurement of 17.6 HU. 
LUNGS: No nodule, mass, or airspace disease. LIVER: No mass. BILIARY TREE: Gallbladder is within normal limits. CBD is not dilated. SPLEEN: within normal limits. PANCREAS: No mass or ductal dilatation. ADRENALS: Right adrenal low density 2.6 x 1.8 cm mass (image 61). KIDNEYS: Bilateral renal cysts. Bilateral nonobstructing renal calculi. STOMACH: Unremarkable. SMALL BOWEL: No dilatation or wall thickening. COLON: No dilatation or wall thickening. Moderate amount of stool. APPENDIX: Normal on coronal image 56). PERITONEUM: No ascites or pneumoperitoneum. RETROPERITONEUM: No lymphadenopathy or aortic aneurysm. REPRODUCTIVE ORGANS: Small prostate URINARY BLADDER: No mass or calculus. BONES: Lytic lesion of the right posterior first rib with a 3.6 cm soft tissue 
mass (series 3, image 7). 2.2 cm lytic lesion of the right lateral scapula 
(image 10). Lytic lesion of the left lateral fourth rib with 2.5 cm soft tissue 
mass (image 26). Destructive change in the midthoracic vertebra with pathologic 
fracture and borderline central stenosis (axial image 30). 3.5 cm soft tissue 
mass associated with right posterior rib lesion (image 37). 2.5 cm mass 
associated with pathologic fracture of a left posterior medial rib (image 54). Pathologic fracture of the left lumbar transverse process and facet (series 3, 
image 74). 5.1 cm soft tissue mass associated with a lytic spinous process mass. At the same lumbar level, 1.8 cm lytic lesion without pathologic fracture (image 84). Right posterior lamina L5  2.2 cm soft tissue mass and pathologic fracture 
(image 101). 4.6 cm lytic lesion in the right sacrum (image 106). Pathologic 
fracture through a right mid iliac or 0.1 cm lytic lesion (image 106). Lytic 
left acetabular 19 mm lesion (image 116). Lytic left sacral 2.8 cm lesion (image 113). Lytic left femoral head 19 mm lesion (image 125). Lytic right posterior 
femoral neck and trochanter 3 cm lesion without pathologic fracture (image 128). Pathologic fracture of left inferior pubic ramus by 15 mm lesion (image 134). ABDOMINAL WALL: No mass or hernia. ADDITIONAL COMMENTS: N/A Impression Left upper lobe infiltrative mass with displacement of the trachea and 
compression of the left main pulmonary artery and cut off of the left upper lobe 
bronchus compatible with primary lung malignancy. Multiple osseous metastatic foci, many of which have pathologic fractures as 
described. Of particular concern is the T5 pathologic moderate burst type 
fracture which may resultant cord compression (sagittal image 89). The right 
femoral neck is at high risk for pathologic fracture. L5 and T11 is at high risk 
for pathologic fracture. Mild compression of the T12 vertebra. Right adrenal metastatic lesion. Left pleural effusion and pericardial effusion 
may be malignant. Incidental constipation, bilateral nonobstructing renal calculi. This result was called by me at 1646 hours to Dr. January Dickson 
[id number] Records reviewed and summarized above. Pathology report(s) reviewed above. Radiology report(s) reviewed above. Assessment/PLAN:  
 
1) presumed stage 4 lung cancer with bone mets/ possible cord compression No tissue dx yet Pt had recent clinical decline and brought to hospital.  
CTs show large lung mass with bone mets. MRIs with diffuse mets/ cord compression. Brain MRI negative. Discussed with pt and daughter today. Discussed need tissue dx. Would see if IR can do bone biopsy/ biopsy easiest site. Neurosurgery to see pt. Will consult Rad/onc. Palliative care to see pt. Not sure pt can tolerate any systemic therapy. Would need significant performance status improvement. Goal of care palliative. 2)bone mets with possible cord compression. on decadron. Neurosurgery and Rad/onc eval.  
If no surgery, consider radiation. 3) hypercalcemia. IVF and zometa. Likely due to cancer/ dehydration. Monitor. 4) pain due to cancer. Palliative care help with pain mgmt. 5) psychosocial.  Pt in bed. Not conversant. daughter at bedside. Family supportive. Pt clinically declined recently. Not sure about dispo. Palliative care seeing pt. We will follow Call if questions I appreciate the opportunity to participate in Mr. Seamus Castro's care.  
 
Signed By: Gerald Cole, DO

## 2021-01-27 NOTE — PROGRESS NOTES
Spiritual Care Assessment/Progress Note [address] 
 
 
NAME: Carmen Fitzpatrick      MRN: [id number] AGE: 78 y.o. SEX: male Rastafari Affiliation: Unknown Language: English  
 
1/27/2021     Total Time (in minutes): 18 Spiritual Assessment begun in [facility] through conversation with: 
  
    [x]Patient        [x] Family    [] Friend(s) Reason for Consult: Advance medical directive consult Spiritual beliefs: (Please include comment if needed) 
   [] Identifies with a sarath tradition:     
   [] Supported by a sarath community:        
   [] Claims no spiritual orientation:       
   [] Seeking spiritual identity:            
   [] Adheres to an individual form of spirituality:       
   [x] Not able to assess:                   
 
    
Identified resources for coping:  
   [] Prayer                           
   [] Music                  [] Guided Imagery [x] Family/friends                 [] Pet visits [] Devotional reading                         [] Unknown 
   [] Other:                                         
 
 
Interventions offered during this visit: (See comments for more details) Patient Interventions: Affirmation of emotions/emotional suffering, Coping skills reviewed/reinforced Family/Friend(s): Affirmation of emotions/emotional suffering, Coping skills reviewed/reinforced Plan of Care: 
 
 [] Support spiritual and/or cultural needs  
 [] Support AMD and/or advance care planning process    
 [] Support grieving process 
 [] Coordinate Rites and/or Rituals  
 [] Coordination with community clergy [] No spiritual needs identified at this time 
 [] Detailed Plan of Care below (See Comments)  [] Make referral to Music Therapy 
[] Make referral to Pet Therapy    
[] Make referral to Addiction services 
[] Make referral to [facility] 
[] Make referral to Spiritual Care Partner 
[] No future visits requested       
[x] Follow up visits as needed Visited with pt who spoke little and softly. His daughter Roark Hammans was at bedside. Pt is single and lives alone. He has two daughters and a son as well as several grandchildren. Chaplain Naylor MDiv, MS, [address] 
[phone] PRA ([phone])

## 2021-01-27 NOTE — PROGRESS NOTES
Palliative Medicine Consult Benjamin: [phone] ([phone]) Patient Name: Logan Ruby YOB: 1950 Date of Initial Consult: 1/26/21 Reason for Consult: Care decisions Requesting Provider:  Dr. Leonardo Graff Primary Care Physician: Alissa Correa MD 
 
 SUMMARY:  
Logan Ruby is a 78 y.o. male with a past history of hypertension, ETOH use, depression, chronic back/neck pain, colitis, and kidney stones, who was admitted on 1/25/2021 from home with a diagnosis of left upper lobe mass, suspected metastatic disease, closed T5 burst fracture, and a left pleural effusion. He was brought in under [facility] after his family was concerned for his health and safety. He has been losing weight with a poor appetite, has been complaining of rib pain, and mostly staying in bed and making comments about wanting to die. In the ED, imaging revealed a left upper lobe mass, suspected metastatic disease, closed T5 burst fracture, and a left pleural effusion. He was admitted for further work up and management. Current medical issues leading to Palliative Medicine involvement include: suspected metastatic cancer, failure to thrive, pain, need to discuss care decisions. Social: He is single. He three children, daughters Corina Ibrahim, son Eyl Storm, and several grandchildren. He was living alone prior to admit, but family says that he cannot return home alone. 1/27: He appears weak and tired. His daughter Magdiel Ibrahim reports that he has been in pain. PALLIATIVE DIAGNOSES:  
1. Goals of care 2. Right sided chest pain 3. Anorexia 4. Recent weight loss 5. Failure to thrive 6. Hypoalbuminemia 7. Left upper lobe mass 8. Suspected metastatic disease 9. Depression 10. Chronic neck/back pain 11. T5 burst fracture 12. Hypertension PLAN:  
1. Patient seen at the bedside with his daughter Magdiel Ibrahim present. We reviewed the medical information and updates from today. We also discussed pain management.  The patient is unable to verbalize much about his pain, but his daughter has noticed that he seems to be hurting intermittently. Plan made to start a small dose of dilaudid 1mg PO every 4 hours scheduled to get him better basal pain control. I also let him know that additional IV dilaudid is available for breakthrough pain. Will follow up tomorrow to see how he is responding to this regimen. 2. Call returned to [facility]. We talked about the latest updates from oncology and speech therapy. We again reviewed the different options for care depending on what oncology has to say (treatment vs hospice and what palliative treatment means). The family definitely wants to pursue the biopsy for diagnosis. [facility] suspects that her dad will want to go the hospice route, but they do want to know the official diagnosis and prognosis before making any decisions. 3. Plan made to follow up tomorrow to do an advanced medical directive and to place a referal to [facility] to help with additional paperwork the patient may need to take care of to get his affairs in order. 4. Communicated plan of care with: Palliative [facility] 192 Team 
 
 GOALS OF CARE / TREATMENT PREFERENCES:  
 
GOALS OF CARE: 
Patient/Health Care Proxy Stated Goals: Other (comment)(perform biopsy, obtain diagnosis, find out options, pain controlled) TREATMENT PREFERENCES:  
Code Status: Full Code Advance Care Planning: 
[] [facility] Interdisciplinary Team has updated the ACP Navigator with Selina and Patient Capacity Advance Care Planning 1/26/2021 Confirm Advance Directive None Patient Would Like to Complete Advance Directive Yes Medical Interventions: Full interventions Other Instructions: Other: As far as possible, the palliative care team has discussed with patient / health care proxy about goals of care / treatment preferences for patient.  
 
 HISTORY:  
 
History obtained from: patient, chart CHIEF COMPLAINT: Right sided chest pain, failure to thrive HPI/SUBJECTIVE: The patient is:  
[x] Verbal and participatory [] Non-participatory due to: He reports severe pain in his right chest, but says that it feels better after receiving pain medication. He denies shortness of breath or nausea. He is hungry and currently reports a good appetite. He is asking for an egg sandwich. He is complaining of a dry mouth and can only whisper to talk. Clinical Pain Assessment (nonverbal scale for severity on nonverbal patients):  
Clinical Pain Assessment Severity: 0 Location: right chest 
Character: aching Duration: days Frequency: intermittent Activity (Movement): Lying quietly, normal position Duration: for how long has pt been experiencing pain (e.g., 2 days, 1 month, years) Frequency: how often pain is an issue (e.g., several times per day, once every few days, constant) FUNCTIONAL ASSESSMENT:  
 
Palliative Performance Scale (PPS): PPS: 40 PSYCHOSOCIAL/SPIRITUAL SCREENING:  
 
Palliative IDT has assessed this patient for cultural preferences / practices and a referral made as appropriate to needs (Cultural Services, Patient Advocacy, Ethics, etc.) Any spiritual / Gnosticist concerns: 
[] Yes /  [x] No 
 
Caregiver Burnout: 
[] Yes /  [x] No /  [] No Caregiver Present Anticipatory grief assessment:  
[x] Normal  / [] Maladaptive ESAS Anxiety: ESAS Depression:    
 
 
 REVIEW OF SYSTEMS:  
 
Positive and pertinent negative findings in ROS are noted above in HPI. The following systems were [x] reviewed / [] unable to be reviewed as noted in HPI Other findings are noted below. Systems: constitutional, ears/nose/mouth/throat, respiratory, gastrointestinal, genitourinary, musculoskeletal, integumentary, neurologic, psychiatric, endocrine. Positive findings noted below. Modified ESAS Completed by: provider Fatigue: 10 Drowsiness: 3 Pain: 0   Nausea: 0  
Anorexia: 3 Dyspnea: 0 PHYSICAL EXAM:  
 
From RN flowsheet: 
Wt Readings from Last 3 Encounters:  
01/26/21 151 lb (68.5 kg) 01/25/21 151 lb (68.5 kg) 06/02/11 193 lb 12.8 oz (87.9 kg) Blood pressure 134/87, pulse 86, temperature 97.9 °F (36.6 °C), resp. rate 16, SpO2 99 %. Pain Scale 1: Numeric (0 - 10) Pain Intensity 1: 8 Pain Onset 1: Acute Pain Location 1: Chest 
Pain Orientation 1: Right Pain Description 1: Aching Pain Intervention(s) 1: Medication (see MAR) Last bowel movement, if known:  
 
Constitutional: awake, alert, frail Eyes: pupils equal, anicteric ENMT: no nasal discharge, dry mucous membranes Cardiovascular: regular rhythm Respiratory: breathing not labored, symmetric Musculoskeletal: no deformity, no tenderness to palpation Skin: warm, dry Neurologic: following commands, moving all extremities Psychiatric: calm HISTORY:  
 
Active Problems: Metastatic lung cancer (metastasis from lung to other site) [facility]) (1/25/2021) Goals of care, counseling/discussion () Right-sided chest pain () Anorexia () Hypoalbuminemia () Past Medical History:  
Diagnosis Date  Abnormal ECG 6/2/2011  Chronic back pain  Chronic cervical pain  Colitis  Dyspnea  Essential hypertension 11 yrs  Palpitation  Stone, kidney Past Surgical History:  
Procedure Laterality Date  ECHO EXERCISE STRESS  6/9/2011  
 normal, no ischemia Family History Problem Relation Age of Onset  Heart Surgery Father   
     bicuspid aortic valve History reviewed, no pertinent family history. Social History Tobacco Use  Smoking status: Current Some Day Smoker  Smokeless tobacco: Current User Substance Use Topics  Alcohol use: Yes Comment: 3-5 beers Allergies Allergen Reactions  Bactrim [Sulfamethoprim Ds] Unknown (comments) Current Facility-Administered Medications Medication Dose Route Frequency  HYDROmorphone (DILAUDID) tablet 1 mg  1 mg Oral Q4H  
 HYDROmorphone (PF) (DILAUDID) injection 0.5 mg  0.5 mg IntraVENous Q3H PRN  
 naloxone (NARCAN) injection 0.4 mg  0.4 mg IntraVENous PRN  
 hydrALAZINE (APRESOLINE) 20 mg/mL injection 10 mg  10 mg IntraVENous Q6H PRN  
 lisinopriL (PRINIVIL, ZESTRIL) tablet 20 mg  20 mg Oral DAILY  hydroCHLOROthiazide (HYDRODIURIL) tablet 12.5 mg  12.5 mg Oral DAILY  lidocaine (XYLOCAINE) 2 % jelly   Mucous Membrane PRN  
 cloNIDine (CATAPRES) 0.2 mg/24 hr patch 1 Patch  1 Patch TransDERmal Q7D  
 famotidine (PF) (PEPCID) 20 mg in 0.9% sodium chloride 10 mL injection  20 mg IntraVENous Q12H  
 dexamethasone (DECADRON) 4 mg/mL injection 4 mg  4 mg IntraVENous Q6H  
 sodium chloride (NS) flush 5-40 mL  5-40 mL IntraVENous Q8H  
 sodium chloride (NS) flush 5-40 mL  5-40 mL IntraVENous PRN  
 acetaminophen (TYLENOL) tablet 650 mg  650 mg Oral Q6H PRN Or  
 acetaminophen (TYLENOL) suppository 650 mg  650 mg Rectal Q6H PRN  polyethylene glycol (MIRALAX) packet 17 g  17 g Oral DAILY PRN  promethazine (PHENERGAN) tablet 12.5 mg  12.5 mg Oral Q6H PRN Or  
 ondansetron (ZOFRAN) injection 4 mg  4 mg IntraVENous Q6H PRN  
 enoxaparin (LOVENOX) injection 40 mg  40 mg SubCUTAneous DAILY  0.9% sodium chloride infusion  100 mL/hr IntraVENous CONTINUOUS  
 
 
 
 LAB AND IMAGING FINDINGS:  
 
Lab Results Component Value Date/Time WBC 19.1 (H) 01/26/2021 01:45 AM  
 HGB 11.1 (L) 01/26/2021 01:45 AM  
 PLATELET 856 26/38/1054 01:45 AM  
 
Lab Results Component Value Date/Time Sodium 134 (L) 01/27/2021 12:20 AM  
 Potassium 3.8 01/27/2021 12:20 AM  
 Chloride 100 01/27/2021 12:20 AM  
 CO2 25 01/27/2021 12:20 AM  
 BUN 29 (H) 01/27/2021 12:20 AM  
 Creatinine 1.10 01/27/2021 12:20 AM  
 Calcium 11.1 (H) 01/27/2021 12:20 AM  
  
Lab Results Component Value Date/Time Alk.  phosphatase 106 01/26/2021 01:45 AM  
 Protein, total 7.5 01/26/2021 01:45 AM  
 Albumin 2.3 (L) 01/26/2021 01:45 AM  
 Globulin 5.2 (H) 01/26/2021 01:45 AM  
 
No results found for: INR, PTMR, PTP, PT1, PT2, APTT, INREXT, INREXT No results found for: IRON, FE, TIBC, IBCT, PSAT, FERR No results found for: PH, PCO2, PO2 No components found for: [place] No results found for: CPK, CKMB Total time: 25 min Counseling / coordination time, spent as noted above: 20 min 
> 50% counseling / coordination?: yes Prolonged service was provided for  []30 min   []75 min in face to face time in the presence of the patient, spent as noted above. Time Start:  
Time End:  
Note: this can only be billed with 82916 (initial) or 57716 (follow up). If multiple start / stop times, list each separately.

## 2021-01-28 NOTE — ACP (ADVANCE CARE PLANNING)
[address] [facility] Adult  Hospitalist Group Advance Care Planning Note Name: Eliel Pedro YOB: 1950 MRN: [id number] Admission Date: 1/25/2021  6:42 PM 
 
Date of discussion: 1/28/2021 Active Diagnoses: 
 
Hospital Problems  Date Reviewed: 6/2/2011 Codes Class Noted POA Goals of care, counseling/discussion ICD-10-CM: Z71.89 ICD-9-CM: V65.49  Unknown Unknown Right-sided chest pain ICD-10-CM: R07.9 ICD-9-CM: 786.50  Unknown Unknown Anorexia ICD-10-CM: R63.0 ICD-9-CM: 783.0  Unknown Unknown Hypoalbuminemia ICD-10-CM: E88.09 
ICD-9-CM: 273.8  Unknown Unknown Metastatic lung cancer (metastasis from lung to other site) [facility]) ICD-10-CM: C34.90 ICD-9-CM: 162.9  1/25/2021 Unknown These active diagnoses are of sufficient risk that focused discussion on advance care planning is indicated in order to allow the patient to thoughtfully consider personal goals of care, and if situations arise that prevent the ability to personally give input, to ensure appropriate representation of their personal desires for different levels and aggressiveness of care. Discussion:  
 
Persons present and participating in discussion: Eliel PedroDilma MD, daughter at bedside Topics Discussed: 
Patient's medical condition and diagnosis: [x  ] yes [  ] no  
Surrogate decision maker: [x  ] yes [  ] no Patient's current physical function/cognitive function/frailty: [x  ] yes [  ] no Code Status: [  x] yes [  ] no  
Artificial Nutrition / Dialysis / Non-Invasive Ventilation / Blood Transfusion: [ x ] yes [  ] no Potential Resources for home (durable medical equipment, home nursing, home O2): [x  ] yes [  ] no Overview of Discussion: CODE STATUS. Prognosis. IR consult and biopsy Time Spent:  
 
Total time spent face-to-face in education and discussion: 20 minutes.   
 
Dilma Cage MD 
 Date of Service:  1/28/2021 
3:07 PM

## 2021-01-28 NOTE — PROGRESS NOTES
Physical Therapy Attempted to work with patient. Daughter at bedside and states that patient has had two falls today while getting up to toilet. At this time patient states pain is too great to work with therapy and has received pain medication. Did provide a walker for the patient and also strongly encourage staff to utilize walker and gait belt when assisting patient. Daughter indicates waiting for consult with palliative care. Will follow up tomorrow.  
Christi Vasquez, PT

## 2021-01-28 NOTE — PROGRESS NOTES
Palliative Medicine Consult Benjamin: [phone]) Patient Name: Warren Zarate YOB: 1950 Date of Initial Consult: 1/26/21 Reason for Consult: Care decisions Requesting Provider:  Dr. Basim Oneil Primary Care Physician: Judy Francois MD 
 
 SUMMARY:  
Warren Zarate is a 78 y.o. male with a past history of hypertension, ETOH use, depression, chronic back/neck pain, colitis, and kidney stones, who was admitted on 1/25/2021 from home with a diagnosis of left upper lobe mass, suspected metastatic disease, closed T5 burst fracture, and a left pleural effusion. He was brought in under ECO after his family was concerned for his health and safety. He has been losing weight with a poor appetite, has been complaining of rib pain, and mostly staying in bed and making comments about wanting to die. In the ED, imaging revealed a left upper lobe mass, suspected metastatic disease, closed T5 burst fracture, and a left pleural effusion. He was admitted for further work up and management. Current medical issues leading to Palliative Medicine involvement include: suspected metastatic cancer, failure to thrive, pain, need to discuss care decisions. Social: He is single. He three children, daughters Lawanda Chambers, son [facility], and several grandchildren. He was living alone prior to admit, but family says that he cannot return home alone. 1/27: He appears weak and tired. His daughter Madeline Chambers reports that he has been in pain. 1/28: He continues to have a lot of pain, as well as anxiety. PALLIATIVE DIAGNOSES:  
1. Goals of care 2. Right sided chest pain 3. Anxiety 4. Anorexia 5. Recent weight loss 6. Failure to thrive 7. Hypoalbuminemia 8. Left upper lobe mass 9. Suspected metastatic disease 10. Depression 11. Chronic neck/back pain 12. T5 burst fracture 13. Hypertension  PLAN:  
 1. Patient seen at the bedside with his daughter Abby Hester present. We talked at length and I also later talked at length with the patient's son Luz Moreira and daughter Alannah Raymond over the phone. During both conversations, we reviewed the latest medical information and updates from today. We also discussed the information from oncology. It sounds like there are no viable treatment options at this time/possibly ever. The family is asking for clarification on whether the patient would ever be a candidate for treatment if his functional status improved. I let them know that I would find out this information from the oncology team as this is an important factor for them in making care decisions. 2. We talked at length about next steps. The family is struggling with deciding on whether to proceed with the biopsy. I explained that depending on the patient's goals and the big picture outlook, it may not make sense to put him through the biopsy process if we are not going to do treatment and/or treatment is not and will not ever be an option. Again, the family asks if treatment would ever be an option. 3. We then talked about what it would take to improve his functional status to the point that he could have treatment if, hypothetically, that was a possibility. I expressed my concern about his debilitated status and that I am not sure if it is even possible for him to do any kind of PT/OT/rehab at this point. We also talked about the fact that if his time is limited, do we really want him to have to be alone in a rehab facility doing exercise/therapy when he does not feel well. We talked about needing to take this into consideration as we talk through goals for his care. 4. We talked at length about the difference between palliative care and hospice care. I explained in great detail the services offered by both teams and how each could benefit the patient. 5. We also discussed pain management. The patient is now verbalizing more complaints about his pain. We talked about trying to find the right pain medication regimen for him and that it may take a few days to adjust the meds to the right dosages. For pain, I started him on MS Contin 15mg PO BID and increased his breakthrough pain medication from Dilaudid 1mg PO to 2mg PO Q4 scheduled. Dilaudid IV 1mg is also available for breakthrough pain. 6. Nursing and family had noted the patient seems anxious. We talked about starting an anxiolytic medication PRN to help with this. Ativan started by attending. 7. Plan made to follow up tomorrow to continue our discussion on goals of care and to obtain more information for the family. Of note, the family would like to set up a family meeting with the treatment team to discuss next steps. 8. Will also plan to help with an advanced medical directive. The family would like to use [facility] [employer] for this. I will also place a referal to [facility] to help with additional paperwork the patient may need to take care of to get his affairs in order. 9. Communicated plan of care with: Palliative [facility] Team (Dr. Julio Cesar Carrillo) GOALS OF CARE / TREATMENT PREFERENCES:  
 
GOALS OF CARE: 
Patient/Health Care Proxy Stated Goals: Prolong life TREATMENT PREFERENCES:  
Code Status: DNR Advance Care Planning: 
[] The [facility] Interdisciplinary Team has updated the ACP Navigator with [address] and Patient Capacity Advance Care Planning 1/26/2021 Confirm Advance Directive None Patient Would Like to Complete Advance Directive Yes Medical Interventions: Limited additional interventions Other Instructions: Other: As far as possible, the palliative care team has discussed with patient / health care proxy about goals of care / treatment preferences for patient. HISTORY:  
 
History obtained from: patient, chart CHIEF COMPLAINT: Right sided chest pain, failure to thrive HPI/SUBJECTIVE: The patient is:  
[x] Verbal and participatory [] Non-participatory due to: He reports severe pain in his right chest, but says that it feels better after receiving pain medication. He denies shortness of breath or nausea. He is hungry and currently reports a good appetite. He is asking for an egg sandwich. He is complaining of a dry mouth and can only whisper to talk. Clinical Pain Assessment (nonverbal scale for severity on nonverbal patients):  
Clinical Pain Assessment Severity: 5 Location: chest 
Character: sharp Duration: days Frequency: constant, gets worse intermittently Activity (Movement): Lying quietly, normal position Duration: for how long has pt been experiencing pain (e.g., 2 days, 1 month, years) Frequency: how often pain is an issue (e.g., several times per day, once every few days, constant) FUNCTIONAL ASSESSMENT:  
 
Palliative Performance Scale (PPS): PPS: 30 
 
 
 PSYCHOSOCIAL/SPIRITUAL SCREENING:  
 
Palliative IDT has assessed this patient for cultural preferences / practices and a referral made as appropriate to needs (Cultural Services, Patient Advocacy, Ethics, etc.) Any spiritual / Orthodoxy concerns: 
[] Yes /  [x] No 
 
Caregiver Burnout: 
[] Yes /  [x] No /  [] No Caregiver Present Anticipatory grief assessment:  
[x] Normal  / [] Maladaptive ESAS Anxiety: Anxiety: 4 ESAS Depression:    
 
 
 REVIEW OF SYSTEMS:  
 
Positive and pertinent negative findings in ROS are noted above in HPI. The following systems were [x] reviewed / [] unable to be reviewed as noted in HPI Other findings are noted below. Systems: constitutional, ears/nose/mouth/throat, respiratory, gastrointestinal, genitourinary, musculoskeletal, integumentary, neurologic, psychiatric, endocrine. Positive findings noted below. Modified ESAS Completed by: provider Fatigue: 8 Drowsiness: 2 Pain: 5 Anxiety: 4 Nausea: 0 Anorexia: 6 Dyspnea: 0 PHYSICAL EXAM:  
 
From RN flowsheet: 
Wt Readings from Last 3 Encounters:  
01/26/21 151 lb (68.5 kg) 01/25/21 151 lb (68.5 kg) 06/02/11 193 lb 12.8 oz (87.9 kg) Blood pressure (!) 143/98, pulse 80, temperature 97.5 °F (36.4 °C), resp. rate 16, SpO2 94 %. Pain Scale 1: Numeric (0 - 10) Pain Intensity 1: 9 Pain Onset 1: acute Pain Location 1: Chest 
Pain Orientation 1: Left Pain Description 1: Aching Pain Intervention(s) 1: Medication (see MAR) Last bowel movement, if known:  
 
Constitutional: awake, alert, frail Eyes: pupils equal, anicteric ENMT: no nasal discharge, dry mucous membranes Cardiovascular: regular rhythm Respiratory: breathing not labored, symmetric Musculoskeletal: no deformity, no tenderness to palpation Skin: warm, dry Neurologic: following commands, moving all extremities, confused Psychiatric: anxious HISTORY:  
 
Active Problems: Metastatic lung cancer (metastasis from lung to other site) [facility]) (1/25/2021) Goals of care, counseling/discussion () Right-sided chest pain () Anorexia () Hypoalbuminemia () Past Medical History:  
Diagnosis Date  Abnormal ECG 6/2/2011  Chronic back pain  Chronic cervical pain  Colitis  Dyspnea  Essential hypertension 11 yrs  Palpitation  Stone, kidney Past Surgical History:  
Procedure Laterality Date  ECHO EXERCISE STRESS  6/9/2011  
 normal, no ischemia Family History Problem Relation Age of Onset  Heart Surgery Father   
     bicuspid aortic valve History reviewed, no pertinent family history. Social History Tobacco Use  Smoking status: Current Some Day Smoker  Smokeless tobacco: Current User Substance Use Topics  Alcohol use: Yes Comment: 3-5 beers Allergies Allergen Reactions  Bactrim [Sulfamethoprim Ds] Unknown (comments) Current Facility-Administered Medications Medication Dose Route Frequency  HYDROmorphone (PF) (DILAUDID) injection 1 mg  1 mg IntraVENous Q3H PRN  
 HYDROmorphone (DILAUDID) tablet 2 mg  2 mg Oral Q4H  
 morphine CR (MS CONTIN) tablet 15 mg  15 mg Oral Q12H  
 LORazepam (ATIVAN) tablet 0.5 mg  0.5 mg Oral Q6H PRN  
 naloxone (NARCAN) injection 0.4 mg  0.4 mg IntraVENous PRN  
 hydrALAZINE (APRESOLINE) 20 mg/mL injection 10 mg  10 mg IntraVENous Q6H PRN  
 lisinopriL (PRINIVIL, ZESTRIL) tablet 20 mg  20 mg Oral DAILY  hydroCHLOROthiazide (HYDRODIURIL) tablet 12.5 mg  12.5 mg Oral DAILY  lidocaine (XYLOCAINE) 2 % jelly   Mucous Membrane PRN  
 cloNIDine (CATAPRES) 0.2 mg/24 hr patch 1 Patch  1 Patch TransDERmal Q7D  
 famotidine (PF) (PEPCID) 20 mg in 0.9% sodium chloride 10 mL injection  20 mg IntraVENous Q12H  
 dexamethasone (DECADRON) 4 mg/mL injection 4 mg  4 mg IntraVENous Q6H  
 sodium chloride (NS) flush 5-40 mL  5-40 mL IntraVENous Q8H  
 sodium chloride (NS) flush 5-40 mL  5-40 mL IntraVENous PRN  
 acetaminophen (TYLENOL) tablet 650 mg  650 mg Oral Q6H PRN Or  
 acetaminophen (TYLENOL) suppository 650 mg  650 mg Rectal Q6H PRN  polyethylene glycol (MIRALAX) packet 17 g  17 g Oral DAILY PRN  promethazine (PHENERGAN) tablet 12.5 mg  12.5 mg Oral Q6H PRN Or  
 ondansetron (ZOFRAN) injection 4 mg  4 mg IntraVENous Q6H PRN  
 enoxaparin (LOVENOX) injection 40 mg  40 mg SubCUTAneous DAILY  0.9% sodium chloride infusion  100 mL/hr IntraVENous CONTINUOUS  
 
 
 
 LAB AND IMAGING FINDINGS:  
 
Lab Results Component Value Date/Time WBC 19.1 (H) 01/26/2021 01:45 AM  
 HGB 11.1 (L) 01/26/2021 01:45 AM  
 PLATELET 848 66/78/1447 01:45 AM  
 
Lab Results Component Value Date/Time  Sodium 133 (L) 01/28/2021 12:04 AM  
 Potassium 4.1 01/28/2021 12:04 AM  
 Chloride 100 01/28/2021 12:04 AM  
 CO2 22 01/28/2021 12:04 AM  
 BUN 31 (H) 01/28/2021 12:04 AM  
 Creatinine 1.07 01/28/2021 12:04 AM  
 Calcium 10.2 (H) 01/28/2021 12:04 AM  
  
Lab Results Component Value Date/Time Alk. phosphatase 106 01/26/2021 01:45 AM  
 Protein, total 7.5 01/26/2021 01:45 AM  
 Albumin 2.3 (L) 01/26/2021 01:45 AM  
 Globulin 5.2 (H) 01/26/2021 01:45 AM  
 
No results found for: INR, PTMR, PTP, PT1, PT2, APTT, INREXT, INREXT No results found for: IRON, FE, TIBC, IBCT, PSAT, FERR No results found for: PH, PCO2, PO2 No components found for: [place] No results found for: CPK, CKMB Total time: 110 min Counseling / coordination time, spent as noted above: 20 min 
> 50% counseling / coordination?: yes Prolonged service was provided for  []30 min   [x]75 min in face to face time in the presence of the patient, spent as noted above. Time Start: 4809-5039, 3516-0965 Time End:  
Note: this can only be billed with 72984 (initial) or 48068 (follow up). If multiple start / stop times, list each separately.

## 2021-01-28 NOTE — PROGRESS NOTES
SLP Treatment Note Pt and pt's daughters seen for dysphagia and voice treatment. Discussed pt's FEES yesterday, as well as incidental findings (in generalities as SLP obviously not a MD). Discussed aspiration precautions. 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 Daughter asked about protuberance noted on FEES yesterday, and SLP stated that the MD could address. Pt's daughters asked appropriate questions and all questions answered to best of SLP's ability. At this time, no further acute care SLP needs anticipated. Will sign-off. Additionally, pt noted to be fidgety throughout session, particularly with his leads. SLP provided him with an activity apron. Thank you, Nilda Bernabe M.Ed, CCC-SLP Speech-Language Pathologist

## 2021-01-28 NOTE — PROGRESS NOTES
Hospitalist Progress Note NAME: Tay Sorensen :  1950 MRN:  [id number] Assessment / Plan: 
Weight loss/apathy/anorexia: Severe protein calorie malnutrition 
-Likely secondary to metastatic disease 
-Consulted nutrition for dietary supplementation 
 
  
Left upper lobe lung mass Metastatic lung cancer Rib pain 
-Caution with opiate analgesics: Will start on hydromorphone 0.5 mg every 4 hours 
-Patient initially treated with Dilaudid on admission but required Narcan reversal 
-Treat pain with Toradol as needed -Reports severe pain 9/10 
-Heme-onc on consult. Appreciate recs, consult IR for biopsy. Oncology following. Neurosurgery and palliative care will follow No indication for surgical intervention Discussed with family regarding IR biopsy. No biopsy planned. Goal of care at this time, symptom management. Palliative care following. Patient DNR at this time. Discussed with daughter at bedside. She will communicate with rest of family. Pain medication adjusted. Closed stable burst fracture of fifth thoracic vertebra 
-Likely sequelae of metastatic lung cancer 
-Decadron x1 dose. -Heme-onc and neurosurgery to follow 
  Left pleural effusion 
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 
  
Bilateral nonobstructing renal calculi 
-No acute intervention 
  
Hypertension: With hypertensive urgency, due to essential hypertension and severe pain 
-Resume home Prinzide 
-Continue current medication. Increase clonidine 
  
Hypercalcemia of Malignancy 
-Continue IV fluid, Zometa given. Daily lab. 
-Daily lab 
  
  
  
  
FEN/GI  hold IV fluid due to hypertension Activity - as tolerated DVT prophylaxis - Lovenox GI prophylaxis -  NI Disposition - TBD 
  
CODE STATUS:  Full code Son at bedside updated Subjective: Chief Complaint / Reason for Physician Visit \" Daughter at bedside. ,  Patient reports severe pain 9/10 anterior chest.  Wakes up multiple times, very anxious. \". Discussed with RN events overnight. Review of Systems: 
Symptom Y/N Comments  Symptom Y/N Comments Fever/Chills    Chest Pain Poor Appetite    Edema Cough    Abdominal Pain Sputum    Joint Pain SOB/DYER    Pruritis/Rash Nausea/vomit    Tolerating PT/OT Diarrhea    Tolerating Diet Constipation    Other Could NOT obtain due to:   
 
Objective: VITALS:  
Last 24hrs VS reviewed since prior progress note. Most recent are: 
Patient Vitals for the past 24 hrs: 
 Temp Pulse Resp BP SpO2  
01/28/21 1439 97.5 °F (36.4 °C) 80 16 (!) 143/98 94 % 01/28/21 1023  84  (!) 169/88   
01/28/21 0757 97.6 °F (36.4 °C) 83 17 (!) 168/101 97 % 01/28/21 0215 97.3 °F (36.3 °C) 77 17 (!) 167/98 97 % 01/27/21 2118 97.3 °F (36.3 °C) 85 16 (!) 143/89 98 % Intake/Output Summary (Last 24 hours) at 1/28/2021 1505 Last data filed at 1/28/2021 1331 Gross per 24 hour Intake  Output 250 ml Net -250 ml I had a face to face encounter and independently examined this patient on 1/28/2021, as outlined below: PHYSICAL EXAM: 
General: WD, WN. Alert, cooperative, no acute distress, ill looking patient EENT:  EOMI. Anicteric sclerae. MMM Resp:  CTA bilaterally, no wheezing or rales. No accessory muscle use CV:  Regular  rhythm,  No edema GI:  Soft, Non distended, Non tender. +Bowel sounds Neurologic:  Alert and oriented , hoarseness. Psych:   Anxious. Insomnia, 
Skin:  No rashes. No jaundice Reviewed most current lab test results and cultures  YES Reviewed most current radiology test results   YES Review and summation of old records today    NO Reviewed patient's current orders and MAR    YES 
PMH/SH reviewed - no change compared to H&P 
________________________________________________________________________ Care Plan discussed with: 
  Comments Patient Family RN Care Manager Consultant                   Multidiciplinary team rounds were held today with , nursing, pharmacist and clinical coordinator. Patient's plan of care was discussed; medications were reviewed and discharge planning was addressed. ________________________________________________________________________ Total NON critical care TIME:  35  Minutes Total CRITICAL CARE TIME Spent:   Minutes non procedure based Comments >50% of visit spent in counseling and coordination of care    
________________________________________________________________________ Dinesh Rand MD  
 
Procedures: see electronic medical records for all procedures/Xrays and details which were not copied into this note but were reviewed prior to creation of Plan. LABS: 
I reviewed today's most current labs and imaging studies. Pertinent labs include: 
Recent Labs  
  01/26/21 
0145 01/25/21 
1516 WBC 19.1* 17.2* HGB 11.1* 11.9*  
HCT 33.2* 35.5*  431* Recent Labs  
  01/28/21 
0004 01/27/21 
0020 01/26/21 
0145 01/25/21 
1516 * 134* 132* 136  
K 4.1 3.8 4.2 4.0  
 100 100 97 CO2 22 25 26 30 * 137* 150* 113* BUN 31* 29* 30* 28* CREA 1.07 1.10 1.17 1.39* CA 10.2* 11.1* 11.1*  11.0* 11.0* ALB  --   --  2.3* 2.3* TBILI  --   --  1.1* 1.3* ALT  --   --  20 23 Signed: Dinesh Rand MD

## 2021-01-29 NOTE — PROGRESS NOTES
Dr Windy Churchill at bedside talking to daughter(Tabby), both requested this RN hold all am meds, lab work and any disturbances until after patient wakes up from resting. Family to notify staff when able to provide care

## 2021-01-29 NOTE — HOSPICE
[employer] [facility] Good Help to Those in Need 
[phone] Patient Name: Hazel Falcon YOB: 1950 Age: 78 y.o. [facility]  Note:  Hospice consult noted. Chart reviewed. DEBBIEW, Ruba Stover RN, and  in to meet with pt, who appears confused, and SOB, and his son Nicko Cui. Hospice team met with pts son in waiting room, pts daughter Radha Green and Asmita Cox were on via speaker phone, Pt will be transferred to the [facility] later this afternoon for GIP EOL care. PT/FAMILY; Son Nicko Cui live in [place], daughter Radha Green in [place], and Asmita Cox in [place]. Pt is single. CONSENTS; Completed, no AMD on file, all 3 children are in agreement with hospice care. DDNR; Completed today DISPOSITION; Pt will go home with family under hospice care, children work out pt care and possibly hire paid caregivers. PSYCHOSOCIAL NEEDS;  Pt has a hx of ETOH, major depression d/o, chronic back pain and new lung mass. Pt may be a ME case as he was found down during a wellness check, family had not heard from him for several days. Pt was admitted to [facility] ED on 1/25/2021, brought in under [facility]. TRANSPORTATION;  to arrange FH; Family is calling  [facility] ( [phone]) [address] [place] [address], [facility] home town. Thank you for the opportunity to be of service to Mr. Osvaldo Valero and his family. Norma LEWISW, MSG [employer] [facility] [phone]

## 2021-01-29 NOTE — DISCHARGE SUMMARY
Hospitalist Discharge Summary Patient ID: 
Anya Cheatham [id number] 
9 y.o. 
1950 1/25/2021 PCP on record: Emeka Beckman MD 
 
Admit date: 1/25/2021 Discharge date and time: 1/29/2021 DISCHARGE DIAGNOSIS: 
Metastatic cancer CONSULTATIONS: 
IP CONSULT TO HEMATOLOGY 
IP CONSULT TO PALLIATIVE CARE - PROVIDER 
IP CONSULT TO RADIATION ONCOLOGY 
IP CONSULT TO NEUROSURGERY Excerpted HPI from H&P of Esteban Tong MD: 
Anya Cheatham is a 78 y.o. male past medical history of hypertension, alcohol abuse and major depression who presents to hospital with chief complaint of weight loss and decreased appetite. Patient states over the last month he feels he may have lost about 30-50 pounds unintentionally. He endorses an overall generalized malaise, fatigue and what he describes as a \"poor quality of life. \"  States he does not have readily available outpatient PCP follow-up. Additionally, he complains of severe right rib pain which he describes as intermittent, tearing and 10/10. States this has been ongoing for the last week. he was finally convinced by his family to present to ER today to seek additional care. Per chart review, family states that patient has had very little interest in his usual daily activities and was reportedly states he wanted to just lay in bed and die. He denies any recreational drug use or alcohol abuse. Admits to smoking 1-1/2 pack of cigarettes daily since his teenage years. 
  
The patient denies any fever, chills, chest pain, cough, congestion, recent illness, palpitations, or dysuria. On ER presentation, vitals remarkable for BPs 140s over 120s. Labs remarkable for leukocytosis of 17.2, creatinine 1.39, calcium 11.0  
  
 
 
______________________________________________________________________ DISCHARGE SUMMARY/HOSPITAL COURSE:  for full details see H&P, daily progress notes, labs, consult notes.   
 
Weight loss/apathy/anorexia: Severe protein calorie malnutrition 
-Likely secondary to metastatic disease 
-Consulted nutrition for dietary supplementation 
 FAMILY DECIDED TO PERUSE COMFORT CARE AND HOSPICE 
  
Left upper lobe lung mass Metastatic lung cancer Rib pain 
-Caution with opiate analgesics: Will start on hydromorphone 0.5 mg every 4 hours 
-Patient initially treated with Dilaudid on admission but required Narcan reversal 
-Treat pain with Toradol as needed -Reports severe pain 9/10 
-Heme-onc on consult.  Appreciate recs, consult IR for biopsy. Oncology following. Neurosurgery and palliative care will follow No indication for surgical intervention Discussed with family regarding IR biopsy. No biopsy planned. 
 Goal of care at this time, symptom management. Palliative care following. Patient DNR at this time. Discussed with daughter at bedside. She will communicate with rest of family. Pain medication adjusted. DISCHARGED TO HOSPICE Closed stable burst fracture of fifth thoracic vertebra 
-Likely sequelae of metastatic lung cancer 
-Decadron x1 dose. -Heme-onc and neurosurgery to follow 
  Left pleural effusion 
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 
  
Bilateral nonobstructing renal calculi 
-No acute intervention 
  
Hypertension: With hypertensive urgency, due to essential hypertension and severe pain 
-Resume home Prinzide 
-Continue current medication. Increase clonidine 
  
Hypercalcemia of Malignancy 
-Continue IV fluid, Zometa given. Daily lab. 
-Daily lab 
  
  
  
  
FEN/GI  hold IV fluid due to hypertension Activity - as tolerated DVT prophylaxis - Lovenox GI prophylaxis -  NI Disposition - TBD 
  
CODE STATUS:  Full code Son at bedside updated 
 
 
 
_______________________________________________________________________ Patient seen and examined by me on discharge day. Pertinent Findings: 
Gen:    Not in distress Chest: Clear lungs CVS:   Regular rhythm. No edema Abd:  Soft, not distended, not tender Neuro:  Awake   
_______________________________________________________________________ DISCHARGE MEDICATIONS:  
Current Discharge Medication List  
  
STOP taking these medications  
  
 lisinopril-hydrochlorothiazide (PRINZIDE, ZESTORETIC) 20-12.5 mg per tablet Comments:  
Reason for Stopping:   
   
  
 
 
 
Patient Follow Up Instructions: Activity: Activity as tolerated Diet: Comfort feeding Wound Care: None needed Follow-up with HOME HOSPICE after discharge Follow-up tests/labs none Follow-up Information Follow up With Specialties Details Why Contact Seda Ivory MD Family Medicine   [address], 
[place] 
[phone] 
  
  
 
________________________________________________________________ Risk of deterioration: High 
 
Condition at Discharge:  Stable 
__________________________________________________________________ Disposition Home with hospice services 
 
____________________________________________________________________ Code Status: DNR/DNI 
___________________________________________________________________ Total time in minutes spent coordinating this discharge (includes going over instructions, follow-up, prescriptions, and preparing report for sign off to her PCP) :  >30 minutes Signed: 
Army Reji MD

## 2021-01-29 NOTE — HOSPICE
In to see patient who is demonstrating respiratory distress, anxiety. Hospice attending and  were present. Patient has reported chest pain in addition to his history of chronic back pain related to a injury several years ago. Patient meets [facility] LOC due to unmanaged pain, dyspnea and anxiety. Meeting with son, Frankie Kate who was at bedside, other siblings present by telephone. Closest kin is in [place], [place]. Family is on board with admission to [facility]) at the [facility] LOC. They verbalized understanding that this is a short term admission designed to manage patient's symptoms. The family's back up plan should the patient no longer require inpatient care is to take him home with supplemental caregivers.  home is being discussed among family today. We are planning to transfer him today to the [facility] pending his negative Covid test ( rapid swab performed earlier today) at 3:45 pm. If the test is returned positive, we will admit him [facility]. Del Rutherford RN MSN [facility] Nurse Liaison Donald Temple [phone] ( mobile) [phone] ( office)

## 2021-01-29 NOTE — PROGRESS NOTES
Unable to retrieve AM labs due to refusal from family member. Family would like the labs to be done \"at a later time, when he is more awake\". Will alert day shift staff.

## 2021-01-29 NOTE — PROGRESS NOTES
Occupational Therapy 01/29/21 Chart reviewed, patient d/c to hospice house, therefore not appropriate for acute OT services. Will complete orders. Thank you, Adriana Arambula, OTS

## 2021-01-29 NOTE — PROGRESS NOTES
[facility] [place] [address] [address] W: [phone]  F: [phone] Reason for Visit:  
Cayetano Olmstead is a 78 y.o. male who is seen in consultation fu at the request of Dr. Joelle Zamudio for evaluation of lung mass. Treatment History:  
None from us Not tissue dx of cancer History of Present Illness:  
 
Pt seen today for hospital consult for new lung mass. No tissue dx of cancer. Pt transferred here due to possible cord compression on CT. Pt is for MRI spine and brain. Pt clinically declined recently and brought to hospital by police. Son at bedside. Pt is sleeping at my visit. Son says he cannot give much history. Pain in chest per son. Not eating well. INTERIM HX:  Pt seen today for fu of new lung mass/ bone mets. Pt is awake but does not converse. Daughter at bedside. Family members on phone today. Palliative care seeing pt. Pt's daughter states pain is not controlled. Pt has poor performance status. Past Medical History:  
Diagnosis Date  Abnormal ECG 6/2/2011  Chronic back pain  Chronic cervical pain  Colitis  Dyspnea  Essential hypertension 11 yrs  Palpitation  Stone, kidney Past Surgical History:  
Procedure Laterality Date  ECHO EXERCISE STRESS  6/9/2011  
 normal, no ischemia Social History Tobacco Use  Smoking status: Current Some Day Smoker  Smokeless tobacco: Current User Substance Use Topics  Alcohol use: Yes Comment: 3-5 beers Family History Problem Relation Age of Onset  Heart Surgery Father   
     bicuspid aortic valve Current Facility-Administered Medications Medication Dose Route Frequency  HYDROmorphone (PF) (DILAUDID) injection 1 mg  1 mg IntraVENous Q3H PRN  
 HYDROmorphone (DILAUDID) tablet 2 mg  2 mg Oral Q4H  
 morphine CR (MS CONTIN) tablet 15 mg  15 mg Oral Q12H  
 LORazepam (ATIVAN) tablet 0.5 mg  0.5 mg Oral Q6H PRN  
 naloxone (NARCAN) injection 0.4 mg  0.4 mg IntraVENous PRN  
 hydrALAZINE (APRESOLINE) 20 mg/mL injection 10 mg  10 mg IntraVENous Q6H PRN  
 lisinopriL (PRINIVIL, ZESTRIL) tablet 20 mg  20 mg Oral DAILY  hydroCHLOROthiazide (HYDRODIURIL) tablet 12.5 mg  12.5 mg Oral DAILY  lidocaine (XYLOCAINE) 2 % jelly   Mucous Membrane PRN  
 cloNIDine (CATAPRES) 0.2 mg/24 hr patch 1 Patch  1 Patch TransDERmal Q7D  
 famotidine (PF) (PEPCID) 20 mg in 0.9% sodium chloride 10 mL injection  20 mg IntraVENous Q12H  
 dexamethasone (DECADRON) 4 mg/mL injection 4 mg  4 mg IntraVENous Q6H  
 sodium chloride (NS) flush 5-40 mL  5-40 mL IntraVENous Q8H  
 sodium chloride (NS) flush 5-40 mL  5-40 mL IntraVENous PRN  
 acetaminophen (TYLENOL) tablet 650 mg  650 mg Oral Q6H PRN Or  
 acetaminophen (TYLENOL) suppository 650 mg  650 mg Rectal Q6H PRN  polyethylene glycol (MIRALAX) packet 17 g  17 g Oral DAILY PRN  promethazine (PHENERGAN) tablet 12.5 mg  12.5 mg Oral Q6H PRN Or  
 ondansetron (ZOFRAN) injection 4 mg  4 mg IntraVENous Q6H PRN  
 enoxaparin (LOVENOX) injection 40 mg  40 mg SubCUTAneous DAILY  0.9% sodium chloride infusion  100 mL/hr IntraVENous CONTINUOUS Allergies Allergen Reactions  Bactrim [Sulfamethoprim Ds] Unknown (comments) Review of Systems: A complete review of systems was obtained, negative except as described above. Physical Exam:  
 
Visit Vitals BP (!) 143/98 Pulse 80 Temp 97.5 °F (36.4 °C) Resp 16 SpO2 94% ECOG PS: 2 General: No distress awake Eyes: anicteric sclerae HENT: Atraumatic Neck: Supple Respiratory: normal respiratory effort MS: in bed, general weakness Skin: No rashes, ecchymoses, or petechiae. Normal temperature, turgor, and texture. Psych: minimally conversant Results:  
 
Lab Results Component Value Date/Time  WBC 19.1 (H) 01/26/2021 01:45 AM  
 HGB 11.1 (L) 01/26/2021 01:45 AM  
 HCT 33.2 (L) 01/26/2021 01:45 AM  
 PLATELET 967 06/51/8390 01:45 AM  
 MCV 85.3 01/26/2021 01:45 AM  
 ABS. NEUTROPHILS 14.6 (H) 01/25/2021 03:16 PM  
 
Lab Results Component Value Date/Time Sodium 133 (L) 01/28/2021 12:04 AM  
 Potassium 4.1 01/28/2021 12:04 AM  
 Chloride 100 01/28/2021 12:04 AM  
 CO2 22 01/28/2021 12:04 AM  
 Glucose 102 (H) 01/28/2021 12:04 AM  
 BUN 31 (H) 01/28/2021 12:04 AM  
 Creatinine 1.07 01/28/2021 12:04 AM  
 GFR est AA >60 01/28/2021 12:04 AM  
 GFR est non-AA >60 01/28/2021 12:04 AM  
 Calcium 10.2 (H) 01/28/2021 12:04 AM  
 
Lab Results Component Value Date/Time Bilirubin, total 1.1 (H) 01/26/2021 01:45 AM  
 ALT (SGPT) 20 01/26/2021 01:45 AM  
 Alk. phosphatase 106 01/26/2021 01:45 AM  
 Protein, total 7.5 01/26/2021 01:45 AM  
 Albumin 2.3 (L) 01/26/2021 01:45 AM  
 Globulin 5.2 (H) 01/26/2021 01:45 AM  
 
CT Results (most recent): 
Results from Hospital Encounter encounter on 01/25/21 CT CHEST ABD PELV W CONT Narrative EXAM: CT CHEST ABD PELV W CONT INDICATION: large left pleural effusion, assess for mass vs metastatic disease COMPARISON: Plain radiograph CONTRAST: 100 mL of Isovue-370. TECHNIQUE:  
Following the uneventful intravenous administration of contrast, thin axial 
images were obtained through the chest, abdomen and pelvis. Coronal and sagittal 
reformats were generated. Oral contrast was administered. CT dose reduction was 
achieved through use of a standardized protocol tailored for this examination 
and automatic exposure control for dose modulation. FINDINGS:  
 
CHEST WALL: No mass or axillary lymphadenopathy. THYROID: No nodule. MEDIASTINUM: No mass or lymphadenopathy. CHANI: No mass or lymphadenopathy. THORACIC AORTA: No dissection or aneurysm. MAIN PULMONARY ARTERY: Left main pulmonary artery is circumferentially 
compressed by the left upper lobe mass that measures 7.4 x 11 x 8.7 cm. (Axial 
image 28 and coronal image 48).  There is lateral displacement of the main 
pulmonary trunk by this mass. There is posterior compression of the left 
mainstem bronchus and truncation by this mass (image 29). Left lower lobe 
subsegmental atelectasis. The trachea is displaced to the right. TRACHEA/BRONCHI: Patent. ESOPHAGUS: No wall thickening or dilatation. HEART: Normal in size. Small pericardial effusion. PLEURA: Large left pleural effusion with density measurement of 17.6 HU. 
LUNGS: No nodule, mass, or airspace disease. LIVER: No mass. BILIARY TREE: Gallbladder is within normal limits. CBD is not dilated. SPLEEN: within normal limits. PANCREAS: No mass or ductal dilatation. ADRENALS: Right adrenal low density 2.6 x 1.8 cm mass (image 61). KIDNEYS: Bilateral renal cysts. Bilateral nonobstructing renal calculi. STOMACH: Unremarkable. SMALL BOWEL: No dilatation or wall thickening. COLON: No dilatation or wall thickening. Moderate amount of stool. APPENDIX: Normal on coronal image 56). PERITONEUM: No ascites or pneumoperitoneum. RETROPERITONEUM: No lymphadenopathy or aortic aneurysm. REPRODUCTIVE ORGANS: Small prostate URINARY BLADDER: No mass or calculus. BONES: Lytic lesion of the right posterior first rib with a 3.6 cm soft tissue 
mass (series 3, image 7). 2.2 cm lytic lesion of the right lateral scapula 
(image 10). Lytic lesion of the left lateral fourth rib with 2.5 cm soft tissue 
mass (image 26). Destructive change in the midthoracic vertebra with pathologic 
fracture and borderline central stenosis (axial image 30). 3.5 cm soft tissue 
mass associated with right posterior rib lesion (image 37). 2.5 cm mass 
associated with pathologic fracture of a left posterior medial rib (image 54). Pathologic fracture of the left lumbar transverse process and facet (series 3, 
image 74). 5.1 cm soft tissue mass associated with a lytic spinous process mass. At the same lumbar level, 1.8 cm lytic lesion without pathologic fracture (image 84). Right posterior lamina L5  2.2 cm soft tissue mass and pathologic fracture 
(image 101). 4.6 cm lytic lesion in the right sacrum (image 106). Pathologic 
fracture through a right mid iliac or 0.1 cm lytic lesion (image 106). Lytic 
left acetabular 19 mm lesion (image 116). Lytic left sacral 2.8 cm lesion (image 113). Lytic left femoral head 19 mm lesion (image 125). Lytic right posterior 
femoral neck and trochanter 3 cm lesion without pathologic fracture (image 128). Pathologic fracture of left inferior pubic ramus by 15 mm lesion (image 134). ABDOMINAL WALL: No mass or hernia. ADDITIONAL COMMENTS: N/A Impression Left upper lobe infiltrative mass with displacement of the trachea and 
compression of the left main pulmonary artery and cut off of the left upper lobe 
bronchus compatible with primary lung malignancy. Multiple osseous metastatic foci, many of which have pathologic fractures as 
described. Of particular concern is the T5 pathologic moderate burst type 
fracture which may resultant cord compression (sagittal image 89). The right 
femoral neck is at high risk for pathologic fracture. L5 and T11 is at high risk 
for pathologic fracture. Mild compression of the T12 vertebra. Right adrenal metastatic lesion. Left pleural effusion and pericardial effusion 
may be malignant. Incidental constipation, bilateral nonobstructing renal calculi. This result was called by me at 1646 hours to Dr. Alia Casanova 128 Records reviewed and summarized above. Pathology report(s) reviewed above. Radiology report(s) reviewed above. Assessment/PLAN:  
 
1) presumed stage 4 lung cancer with bone mets/ possible cord compression No tissue dx Pt had recent clinical decline and brought to hospital by police. CTs show large lung mass with bone mets. MRIs with diffuse mets/ cord compression. Brain MRI negative. Pt seen again today for fu.   
Discussed overall situation with pt and daughter at bedside and family on phone today. Long discussion today about possible dx of cancer. Discussed would need tissue dx of cancer to tell them any prognostic info. Discussed would need a tissue dx to tell them any treatment options. Discussed that pt's overall performance status is poor and do not think he can presently tolerate any systemic therapy. He is not a candidate for surgery or radiation due to poor performance status. So not sure doing a biopsy will benefit pt if he cannot tolerate any treatment. Pt's family seems to want to pursue biopsy. Pt has a biopsy ordered via IR. Pt does not contribute to conversation regarding his care. He agrees with his daughter that he wants his pain managed. Goal of care palliative. Palliative care seeing pt/ family for support. Case d/w hospitalist today. We will follow as needed. 2)bone mets with possible cord compression. on decadron. Neurosurgery and Rad/onc eval.  
Rigo surgery or XRT options cely present due to poor performance status. 3) hypercalcemia. IVF and zometa. Likely due to cancer/ dehydration. Monitor. 4) pain due to cancer. Palliative care help with pain mgmt. 5) psychosocial.  Pt in bed. Not conversant. daughter at bedside. Family supportive. Pt clinically declined recently. Not sure about dispo. Palliative care seeing pt. We will follow as needed Call if questions I appreciate the opportunity to participate in Mr. Stefanie Castro's care.  
 
Signed By: Leanora Cockayne, DO

## 2021-01-29 NOTE — PROGRESS NOTES
Palliative Medicine Consult Benjamin: [phone]) Patient Name: Mal Nicholas YOB: 1950 Date of Initial Consult: 1/26/21 Reason for Consult: Care decisions Requesting Provider:  Dr. Dinesh Rand Primary Care Physician: Aliza Jung MD 
 
 SUMMARY:  
Mal Nicholas is a 78 y.o. male with a past history of hypertension, ETOH use, depression, chronic back/neck pain, colitis, and kidney stones, who was admitted on 1/25/2021 from home with a diagnosis of left upper lobe mass, suspected metastatic disease, closed T5 burst fracture, and a left pleural effusion. He was brought in under [facility] after his family was concerned for his health and safety. He has been losing weight with a poor appetite, has been complaining of rib pain, and mostly staying in bed and making comments about wanting to die. In the ED, imaging revealed a left upper lobe mass, suspected metastatic disease, closed T5 burst fracture, and a left pleural effusion. He was admitted for further work up and management. Current medical issues leading to Palliative Medicine involvement include: suspected metastatic cancer, failure to thrive, pain, need to discuss care decisions. Social: He is single. He three children, daughters Cierra Parham, son Tahmina Veliz, and several grandchildren. He was living alone prior to admit, but family says that he cannot return home alone. 1/27: He appears weak and tired. His daughter Bakari Parham reports that he has been in pain. 1/28: He continues to have a lot of pain, as well as anxiety. 1/29: Patient sleeping quietly. Son Tahmina Veliz at the bedside reports that his condition has declined over night and they have decided to consult hospice. PALLIATIVE DIAGNOSES:  
1. Goals of care 2. Concern about end of life 3. Right sided chest pain 4. Anxiety 5. Anorexia 6. Recent weight loss 7. Failure to thrive 8. Hypoalbuminemia 9. Left upper lobe mass 10. Suspected metastatic disease 11. Depression 12. Chronic neck/back pain 13. T5 burst fracture 14. Hypertension PLAN:  
1. Case discussed with Dr. Sugey Darden this morning. The patient is not a candidate for systemic therapy currently and she does not think he will ever be. The biopsy would be for diagnostic information only, not for the patient's benefit. This information was passed on to the patient's son Klaudia Alvarez. 2. I spoke with son Klaudia Alvarez at the bedside. He reports that his father's condition has worsened over the night and the attending thinks that he only has days to live. The family has decided to focus on comfort only and hospice has been consulted. We talked about how difficult this process and decision have been. Psychosocial support offered to son Klaudia Alvarez. 3. [facility] updated on the patient and family. They plan to meet with them shortly. 4. Referal placed to [facility] to help with additional paperwork the patient may need to take care of to get his affairs in order. They plan to reach out to the family today, although he may be too ill at this point to be able to complete any paperwork. 5. Communicated plan of care with: Palliative [facility] 192 Team 
 
 GOALS OF CARE / TREATMENT PREFERENCES:  
 
GOALS OF CARE: 
Patient/Health Care Proxy Stated Goals: Comfort TREATMENT PREFERENCES:  
Code Status: DNR Advance Care Planning: 
[] [facility] Interdisciplinary Team has updated the ACP Navigator with Selina and Patient Capacity Advance Care Planning 1/26/2021 Confirm Advance Directive None Patient Would Like to Complete Advance Directive Yes Medical Interventions: Comfort measures Other Instructions: Other: As far as possible, the palliative care team has discussed with patient / health care proxy about goals of care / treatment preferences for patient. HISTORY:  
 
History obtained from: patient, chart CHIEF COMPLAINT: Right sided chest pain, failure to thrive HPI/SUBJECTIVE: The patient is:  
[x] Verbal and participatory [] Non-participatory due to: He reports severe pain in his right chest, but says that it feels better after receiving pain medication. He denies shortness of breath or nausea. He is hungry and currently reports a good appetite. He is asking for an egg sandwich. He is complaining of a dry mouth and can only whisper to talk. Clinical Pain Assessment (nonverbal scale for severity on nonverbal patients):  
Clinical Pain Assessment Severity: 0 Location: chest 
Character: sharp Duration: days Frequency: constant, gets worse intermittently Activity (Movement): Lying quietly, normal position Duration: for how long has pt been experiencing pain (e.g., 2 days, 1 month, years) Frequency: how often pain is an issue (e.g., several times per day, once every few days, constant) FUNCTIONAL ASSESSMENT:  
 
Palliative Performance Scale (PPS): PPS: 20 
 
 
 PSYCHOSOCIAL/SPIRITUAL SCREENING:  
 
Palliative IDT has assessed this patient for cultural preferences / practices and a referral made as appropriate to needs (Cultural Services, Patient Advocacy, Ethics, etc.) Any spiritual / [employer] concerns: 
[] Yes /  [x] No 
 
Caregiver Burnout: 
[] Yes /  [x] No /  [] No Caregiver Present Anticipatory grief assessment:  
[x] Normal  / [] Maladaptive ESAS Anxiety: Anxiety: 4 ESAS Depression:    
 
 
 REVIEW OF SYSTEMS:  
 
Positive and pertinent negative findings in ROS are noted above in HPI. The following systems were [x] reviewed / [] unable to be reviewed as noted in HPI Other findings are noted below. Systems: constitutional, ears/nose/mouth/throat, respiratory, gastrointestinal, genitourinary, musculoskeletal, integumentary, neurologic, psychiatric, endocrine. Positive findings noted below. Modified ESAS Completed by: provider Fatigue: 10 Drowsiness: 10  
 Pain: 0 Anxiety: 4 Nausea: 0 Anorexia: 6 Dyspnea: 0 PHYSICAL EXAM:  
 
From RN flowsheet: 
Wt Readings from Last 3 Encounters:  
01/26/21 151 lb (68.5 kg) 01/25/21 151 lb (68.5 kg) 06/02/11 193 lb 12.8 oz (87.9 kg) Blood pressure (!) 175/98, pulse 78, temperature 97.6 °F (36.4 °C), resp. rate 16, SpO2 93 %. Pain Scale 1: Numeric (0 - 10) Pain Intensity 1: 10 
Pain Onset 1: acute Pain Location 1: Generalized Pain Orientation 1: Left Pain Description 1: Aching Pain Intervention(s) 1: Medication (see MAR) Last bowel movement, if known:  
 
Constitutional: sleeping quietly, no acute distress, frail Eyes: closed ENMT: no nasal discharge, dry mucous membranes Cardiovascular: regular rhythm Respiratory: breathing not labored, symmetric Musculoskeletal: no deformity, no tenderness to palpation Skin: warm, dry Neurologic: moving all extremities, confused at times HISTORY:  
 
Active Problems: Metastatic lung cancer (metastasis from lung to other site) [facility]) (1/25/2021) Goals of care, counseling/discussion () Right-sided chest pain () Anorexia () Hypoalbuminemia () Anxiety () Past Medical History:  
Diagnosis Date  Abnormal ECG 6/2/2011  Chronic back pain  Chronic cervical pain  Colitis  Dyspnea  Essential hypertension 11 yrs  Palpitation  Stone, kidney Past Surgical History:  
Procedure Laterality Date  ECHO EXERCISE STRESS  6/9/2011  
 normal, no ischemia Family History Problem Relation Age of Onset  Heart Surgery Father   
     bicuspid aortic valve History reviewed, no pertinent family history. Social History Tobacco Use  Smoking status: Current Some Day Smoker  Smokeless tobacco: Current User Substance Use Topics  Alcohol use: Yes Comment: 3-5 beers Allergies Allergen Reactions  Bactrim [Sulfamethoprim Ds] Unknown (comments) Current Facility-Administered Medications Medication Dose Route Frequency  albuterol (PROVENTIL VENTOLIN) nebulizer solution 2.5 mg  2.5 mg Nebulization Q6H PRN  
 guaiFENesin ER (MUCINEX) tablet 600 mg  600 mg Oral Q12H  
 HYDROmorphone (PF) (DILAUDID) injection 0.3 mg  0.3 mg IntraVENous Q30MIN PRN  
 HYDROmorphone (DILAUDID) tablet 2 mg  2 mg Oral Q4H  
 morphine CR (MS CONTIN) tablet 15 mg  15 mg Oral Q12H  
 LORazepam (ATIVAN) tablet 0.5 mg  0.5 mg Oral Q6H PRN  
 hydrALAZINE (APRESOLINE) 20 mg/mL injection 10 mg  10 mg IntraVENous Q6H PRN  
 lisinopriL (PRINIVIL, ZESTRIL) tablet 20 mg  20 mg Oral DAILY  hydroCHLOROthiazide (HYDRODIURIL) tablet 12.5 mg  12.5 mg Oral DAILY  lidocaine (XYLOCAINE) 2 % jelly   Mucous Membrane PRN  
 cloNIDine (CATAPRES) 0.2 mg/24 hr patch 1 Patch  1 Patch TransDERmal Q7D  
 famotidine (PF) (PEPCID) 20 mg in 0.9% sodium chloride 10 mL injection  20 mg IntraVENous Q12H  
 dexamethasone (DECADRON) 4 mg/mL injection 4 mg  4 mg IntraVENous Q6H  
 sodium chloride (NS) flush 5-40 mL  5-40 mL IntraVENous Q8H  
 sodium chloride (NS) flush 5-40 mL  5-40 mL IntraVENous PRN  
 acetaminophen (TYLENOL) tablet 650 mg  650 mg Oral Q6H PRN Or  
 acetaminophen (TYLENOL) suppository 650 mg  650 mg Rectal Q6H PRN  polyethylene glycol (MIRALAX) packet 17 g  17 g Oral DAILY PRN  promethazine (PHENERGAN) tablet 12.5 mg  12.5 mg Oral Q6H PRN Or  
 ondansetron (ZOFRAN) injection 4 mg  4 mg IntraVENous Q6H PRN  
 enoxaparin (LOVENOX) injection 40 mg  40 mg SubCUTAneous DAILY  0.9% sodium chloride infusion  100 mL/hr IntraVENous CONTINUOUS  
 
 
 
 LAB AND IMAGING FINDINGS:  
 
Lab Results Component Value Date/Time WBC 19.1 (H) 01/26/2021 01:45 AM  
 HGB 11.1 (L) 01/26/2021 01:45 AM  
 PLATELET 118 16/85/2737 01:45 AM  
 
Lab Results Component Value Date/Time  Sodium 133 (L) 01/28/2021 12:04 AM  
 Potassium 4.1 01/28/2021 12:04 AM  
 Chloride 100 01/28/2021 12:04 AM  
 CO2 22 01/28/2021 12:04 AM  
 BUN 31 (H) 01/28/2021 12:04 AM  
 Creatinine 1.07 01/28/2021 12:04 AM  
 Calcium 10.2 (H) 01/28/2021 12:04 AM  
  
Lab Results Component Value Date/Time Alk. phosphatase 106 01/26/2021 01:45 AM  
 Protein, total 7.5 01/26/2021 01:45 AM  
 Albumin 2.3 (L) 01/26/2021 01:45 AM  
 Globulin 5.2 (H) 01/26/2021 01:45 AM  
 
No results found for: INR, PTMR, PTP, PT1, PT2, APTT, INREXT, INREXT No results found for: IRON, FE, TIBC, IBCT, PSAT, FERR No results found for: PH, PCO2, PO2 No components found for: [place] No results found for: CPK, CKMB Total time: 25 min Counseling / coordination time, spent as noted above: 20 min 
> 50% counseling / coordination?: yes Prolonged service was provided for  []30 min   []75 min in face to face time in the presence of the patient, spent as noted above. Time Start: 
Time End:  
Note: this can only be billed with 49339 (initial) or 72811 (follow up). If multiple start / stop times, list each separately.

## 2021-01-29 NOTE — PROGRESS NOTES
TRANSFER - OUT REPORT: 
 
Telephone report given to Samina Mayo on [facility]  being transferred to [facility] for change in patient condition(Hospice Care) Report consisted of patients Situation, Background, Assessment and  
Recommendations(SBAR). Information from the following report(s) SBAR, Kardex and Recent Results was reviewed with the receiving nurse. Lines:  
Peripheral IV 01/27/21 Anterior;Distal;Left Forearm (Active) Site Assessment Clean, dry, & intact 01/29/21 0932 Phlebitis Assessment 0 01/29/21 0932 Infiltration Assessment 0 01/29/21 0932 Dressing Status Clean, dry, & intact 01/29/21 0932 Dressing Type Transparent;Tape 01/29/21 0932 Hub Color/Line Status Infusing 01/29/21 0932 Action Taken Open ports on tubing capped 01/29/21 0932 Alcohol Cap Used Yes 01/28/21 4288 Opportunity for questions and clarification was provided. Patient transported with: 
 O2 @ 2 liters Tech ([facility])

## 2021-01-29 NOTE — HOSPICE
[facility] Good Help to Those in Need 
[phone] Patient Name: Mitch Sutherland YOB: 1950 Age: 78 y.o. [facility] RN Note:  Hospice consult received, reviewing chart. Will follow up with Unit Nurse and Care Manager to discuss plan of care, patient status and discharge disposition within the hour. Thank you for the opportunity to be of service to this patient. Myriam Francisco RN, [facility] Hospice Nurse Liaison [phone] [place] [phone] Office

## 2021-01-30 NOTE — HOSPICE
NAME OF PATIENT:  Rodri Sumner 
  LEVEL OF CARE:  GIP 
  
REASON FOR GIP:  Lung cancer, mets to bone 
n/a 
  
*PATIENT REMAINS ELIGIBLE FOR GIP LEVEL OF CARE AS EVIDENCED BY: (MUST BE ADDRESSED OF PATIENT GIP) 
   
REASON FOR RESPITE: 
  
O2 SAFETY: 
Concentrator positioning (6\" from furniture/drapes) 
  
FALL INTERVENTIONS PROVIDED:  
Implemented/recommended resources for alarm system (personal alarm, bed alarm, call bell, etc.)  and Implemented/recommended environmental changes (remove hazards, lower bed, improve lighting, etc.) 
  INTERDISPLINARY COMMUNICATION/COLLABORATION: 
Physician, MSW, [place] and RN, CNA 
  
NEW MEDICATION INITIATION DOCUMENTATION: 
n/a 
  
Reason medication is being initiated:  n/a 
  
MD / Provider name consulted re: change in status / initiation of new medication:  n/a 
  
New Symptom(s):  n/a 
  
New Order(s):  n/a 
  
Name of the person notified of the changes:  n/a 
  
Name of person being taught:  n/a 
  Instructions given:  n/a 
  
Side Effects taught:  n/a 
  
Response to teaching:  n/a 
  
  
COMFORTABLE DYING MEASURE: 
Is Patient/family satisfied with symptom level?  yes 
  
DISCHARGE PLAN:  end of life care - home with family if home hospice possible option 1905  Received report from outgoing nurse Benja Henry. 1930  Assessed pt, daughters at bedside. Pt resting quietly with eyes closed. No sign of distress. O2 90% on room air. Pt breathing with mouth open, shallow breaths. Diminished lung sounds. 2000  Administered scheduled IV meds, Morphine and Ativan. Dtrs requested all 3 children added to contact list.  Sheryl Alexandra [phone]; Julee Emirflorina (son) [phone]; Ken Alvarez [phone] 
2100  Pt resting quietly with eyes closed. Breathing with mouth open. O2 78%. Placed O2 - 2L with nasal cannula. O2 92% 
2200  Pt resting quietly with eyes closed. No change in respirations. No signs of distress. 93 [facility] catheter.   200 ml red grant yellow error straw urine. Turned patient. No signs of distress. 0020  Pt sleeping, eyes closed, snoring. Mouth care completed. 0100  Pt sleeping, eyes closed. No signs of distress. 0200  Pt sleeping, eyes closed - snoring. No signs of distress. 0305  Pt resting with eyes closed  Turned patient. Pt turned self - used side rail to pull self further on side. Pt restless, pulling off blankets. Scheduled IV meds, Morphine and Ativan given. 0401  Pt sleeping, eyes closed, snoring. No signs of distress. 0500 Pt turned. Sleeping, breathing through mouth-snoring. O2 with 2L nasal cannula - 90%. Emptied 800 ml out of morales drain bag (1000 ml total since placement). Mouth care 
0600  Pt sleeping, breathing through mouth-snoring. 0715  Report given to oncoming nurse, Abhilash Martinez  Pt daughter called. Said father always snored - not a new symptom.   Family will be visiting through the day.

## 2021-01-30 NOTE — H&P
[employer] Good Help to Those in Need 
[phone] Patient Name: Eliel Pedro YOB: 1950 Date of Provider Hospice Visit: 01/29/21 Level of Care:   [x] General Inpatient (GIP)    [] Routine   [] Respite Current Location of Care: 
[] [facility] [] [facility] [] [facility] [] [facility] [x] Hospice House THE [facility], patient referred from: 
[x] [facility] [] [facility] [] [facility] [] [facility] [] Home [] Other:  
 
Date of Original Hospice Admission: 1/29/21 Hospice Medical Director at time of admission: Roxi Steele MD 
 
Principle Hospice Diagnosis: Presumed metastatic lung cancer Diagnoses RELATED to the terminal prognosis:  
Left pleural effusion Pericardial effusion T5 moderate burst fracture Other Diagnoses: Anxiety and Depression HTN Tobacco use Jam Pedro is a 78y.o. year old who was admitted to [facility]. Patient lives alone, and family reportedly requested that the police complete wellness check. He was subsequently taken to the [facility] ER on 1/25/21 for the medical condition in which he was found. In the ER he reported decreased appetite, 50-pound weight loss in the last 2 months and fatigue. He also reported chest pain. Work-up revealed presumed metastatic lung cancer and concern for possible cord compression. He was evaluated by Oncology, and it was felt that he would not be likely to tolerate systemic therapy due to his overall poor performance status. He was also not a candidate for surgery or radiation. The patient and family chose to pursue comfort measures with the support of Hospice. At time of my exam on 1/29, patient appears to be uncomfortable with signs of pain, labored breathing, anxiety and restlessness. I believe he qualifies for inpatient hospice admission for further treatment of the symptoms. He is at high risk of rapid decline and death. Objective information:  
1/25/21 CT C/A/P: 
IMPRESSION Left upper lobe infiltrative mass with displacement of the trachea and 
compression of the left main pulmonary artery and cut off of the left upper lobe 
bronchus compatible with primary lung malignancy. Multiple osseous metastatic foci, many of which have pathologic fractures as 
described. Of particular concern is the T5 pathologic moderate burst type 
fracture which may resultant cord compression (sagittal image 89). The right 
femoral neck is at high risk for pathologic fracture. L5 and T11 is at high risk 
for pathologic fracture. Mild compression of the T12 vertebra. Right adrenal metastatic lesion. Left pleural effusion and pericardial effusion 
may be malignant. Incidental constipation, bilateral nonobstructing renal calculi. 1/26/21 MRI Brain: 
IMPRESSION 1. Enhancing lesion anterior left parietal bone suspicious for osseous 
metastasis. No parenchymal metastases. 2. Mild chronic white matter disease and small focus of acute infarction 
anterior right frontal lobe. HOSPICE ASSESSMENT Active Symptoms and Issues: 
-Cancer-related pain 
-Labored breathing and tachypnea 
-Anxiety/agitation/restlessness--per family, patient has h/o untreated anxiety 
-Dysphagia--per speech therapy, patient with concern for dysphagia given new onset aphonia concerning for vocal fold dysfunction 
-Possible confusion vs effect of anxiety and depression--patient does not participate significantly in conversation and has a delayed response when he does speak 
-Hospice care In 24 hours patient has received dilaudid 2 mg po x three doses, dilaudid 0.3 mg IV x one dose, dilaudid 1 mg IV two doses, ms contin 15 mg x two doses, ativan 0.5 mg po x two doses  for symptoms. He was also receiving dexamethasone 4 mg IV every 6 hours for concern of metastasis with possible cord compression--he only received 2 doses today.  Pt would benefit from GIP LOC for skilled monitoring of nonverbal signs of symptoms and administration/titration of parenteral medications for sx mgt. PLAN 1. GIP level of care needed for symptoms necessitating frequent skilled nursing assessment and administration of parenteral medications. Needs monitoring for need to titrate sx mgt regimen for optimization of comfort. 2. Pt is at high risk of rapid decline and death due to terminal disease process. 3. For pain and labored breathing, we have scheduled morphine 3 mg IV every 4 hours routinely and prn pain, air hunger. 4. For anxiety and restlessness only partially responsive to ativan 0.5 mg IV, we have scheduled ativan 1 mg IV every 4 hours routinely and prn anxiety, agitation, restlessness. 5. Discussed with family that we will monitor his response to these lower doses of medication and titrate as needed based on his response. The family does want him to be as alert as possible, but they understand and accept that the dose of medication necessary to keep him comfortable may cause drowsiness. Ultimately, they are willing for him to be more drowsy if it means that he is not in discomfort. 6. I have discontinued the dexamethasone for now in case it is contributing to his anxiety and restlessness. We could consider resuming this at a reduced dose in the future if it would help his symptoms. 7. Constipation was noted on CT scan on admission. Will try oral senna if tolerated. Patient may need suppository upon arrival to [facility]. 8. We have discontinued oral hypertension medications for now due to concern for dysphagia. Will monitor for need to consider resuming medication. 9. Comfort medications for other symptoms. 8. Counseled family on my observation of patient's sxs and on my recommendations for medication changes. Counseled family on nonverbal signs of pain and restlessness. 11. D/c all previous medications which are not contributing to comfort at this time. 15.  and SW to support family needs. 13. DDNR signed today.  
14. Disposition: if pt's sxs are able to be better controlled and pt is able to tolerate a transition to SL medications, will consider home setting for continued hospice care. Family was counseled on this plan to trial rotation to SL medications if possible with goal of return to home setting and are evaluating their options for home and/or facility care. 15. Hospice plan of care discussed with hospice liaison and KATE, bedside nurse, patient's 3 children, patient--including counseling family regarding my assessment and estimated prognosis, s/s of EOL, recommended medication mgt for present sxs, recommendations regarding need for GIP LOC and conditions in which we would consider transfer of care to the home setting, features of home hospice support and his need for in-home caregiver support 16. Total time: 115 minutes, including over 50% counseling as summarized in this note: 75 minutes from 11:00 am-12:15 PM counseling and coordination with son, daughters, patient, bedside nursing, hospice house staff for [facility] transfer, 10 minutes from 3:45-43:55 in follow up coordination with bedside nurse and counseling family regarding patient's current sxs and plan of care Prognosis estimated based on today's clinical assessment is:  
[] Hours to Days [x] Days to Weeks, though he is at high risk of more rapid decline and death   
[] Other: 
 
 [address] Patient/Medical POA stated Goal of Care: optimize patient comfort 
 
[] I have reviewed and/or updated ACP information in the Advance Care Planning Navigator. This information is available in the [facility] [address] link in the patient's chart header. Primary Medical Decision Maker:  
 
Resuscitation Status: DNR If DNR is there a Durable DNR on file? : [] Yes [x] No (If no, complete Durable DNR)--signed today HISTORY History obtained from: chart review, hospice liaison and KATE, bedside nurse, patient's son CHIEF COMPLAINT: pain The patient is:  
[x] Verbal 
[] Nonverbal 
[] Unresponsive HPI/SUBJECTIVE: 1/29: Patient does not significantly contribute to conversation and appears uncomfortable. Our hospice nurse Cathleen Neal, our hospice social worker Norman Rea and I met with patient's son to discuss his care. His son reviewed the events of hospitalization in detail and discussed his goal for comfort focused care as he has been counseled that his father is not strong enough to receive treatment. He has the feeling that his father's time is very short and potentially in the range of days. After our conversation, he called his 2 sisters to summarize our conversation, and our team discussed recommendations for plan of care with his sisters on speaker phone. All 3 were agreeable to inpatient hospice admission for optimization of his symptom control. I attempted to discuss recommendations for medications with patient, and he did not participate in conversation, only occasionally nodding, saying okay, or looking toward his son. REVIEW OF SYSTEMS The following systems were: [] reviewed  [x] unable to be reviewed in full d/t discomfort from patient's pain, labored breathing, anxiety Positive ROS include bold items: 
Constitutional: fatigue, weakness, in pain, short of breath Ears/nose/mouth/throat: increased airway secretions Respiratory:shortness of breath, wheezing Gastrointestinal:poor appetite, nausea, vomiting, abdominal pain, constipation, diarrhea Musculoskeletal:pain, deformities, swelling legs Neurologic:confusion, hallucinations, weakness Psychiatric:anxiety, feeling depressed, poor sleep Endocrine: increased thirst, increased hunger Adult Non-Verbal Pain Assessment Score: 3 Face 
[] 0   No particular expression or smile 
[x] 1   Occasional grimace, tearing, frowning, wrinkled forehead 
[] 2   Frequent grimace, tearing, frowning, wrinkled forehead Activity (movement) [] 0   Lying quietly, normal position 
[x] 1   Seeking attention through movement or slow, cautious movement 
[] 2   Restless, excessive activity and/or withdrawal reflexes Guarding 
[x] 0   Lying quietly, no positioning of hands over areas of body 
[] 1   Splinting areas of the body, tense 
[] 2   Rigid, stiff Physiology (vital signs) [x] 0   Stable vital signs [] 1   Change in any of the following: SBP > 20mm Hg; HR > 20/minute 
[] 2   Change in any of the following: SBP > 30mm Hg; HR > 25/minute Respiratory 
[] 0   Baseline RR/SpO2, compliant with ventilator 
[x] 1   RR > 10 above baseline, or 5% drop SpO2, mild asynchrony with ventilator 
[] 2   RR > 20 above baseline, or 10% drop SpO2, asynchrony with ventilator FUNCTIONAL ASSESSMENT Palliative Performance Scale (PPS): 10 PSYCHOSOCIAL/SPIRITUAL ASSESSMENT Active Problems: * No active hospital problems. * 
 
Past Medical History:  
Diagnosis Date  Abnormal ECG 6/2/2011  Chronic back pain  Chronic cervical pain  Colitis  Dyspnea  Essential hypertension 11 yrs  Palpitation  Stone, kidney Past Surgical History:  
Procedure Laterality Date  ECHO EXERCISE STRESS  6/9/2011  
 normal, no ischemia Social History Tobacco Use  Smoking status: Current Some Day Smoker  Smokeless tobacco: Current User Substance Use Topics  Alcohol use: Yes Comment: 3-5 beers Family History Problem Relation Age of Onset  Heart Surgery Father   
     bicuspid aortic valve Allergies Allergen Reactions  Bactrim [Sulfamethoprim Ds] Unknown (comments) Current Facility-Administered Medications Medication Dose Route Frequency  bisacodyL (DULCOLAX) suppository 10 mg  10 mg Rectal DAILY PRN  
 acetaminophen (TYLENOL) suppository 650 mg  650 mg Rectal Q4H PRN  
 glycopyrrolate (ROBINUL) injection 0.2 mg  0.2 mg IntraVENous Q4H PRN  
 LORazepam (ATIVAN) injection 1 mg  1 mg IntraVENous Q4H  
 morphine injection 3 mg  3 mg IntraVENous Q4H  
 morphine injection 3 mg  3 mg IntraVENous Q15MIN PRN  
 LORazepam (ATIVAN) injection 1 mg  1 mg IntraVENous Q15MIN PRN  
 ketorolac (TORADOL) injection 30 mg  30 mg IntraVENous Q6H PRN PHYSICAL EXAM  
 
Wt Readings from Last 3 Encounters:  
01/26/21 68.5 kg (151 lb)  
01/25/21 68.5 kg (151 lb) 06/02/11 87.9 kg (193 lb 12.8 oz) Visit Vitals /88 (BP 1 Location: Right upper arm, BP Patient Position: Supine) Pulse 90 Temp (!) 96 °F (35.6 °C) Resp 12 SpO2 (!) 86% Supplemental O2  [] Yes  [] NO Last bowel movement:  
 
Currently this patient has: 
[] Peripheral IV [] PICC  [] PORT [] ICD [] [place] Catheter [] NG Tube   [] PEG Tube   
[] Rectal Tube [] Drain 
[] Other:  
 
Constitutional: sitting up in hospital bed awake, appears uncomfortable Eyes: lids normal, no drainage ENMT: mmm, no exudate, nares normal 
Cardiovascular: tachycardia, peripheral pulses palpable Respiratory: rate low 20s with appearance of labored breathing and accessory muscle use Gastrointestinal: soft, NT 
Musculoskeletal: no gross deformity or TTP Skin: warm, dry, no mottling Neurologic: alert and appears to listen to what I say, responds only sometimes and with some delay possibly due to confusion and/or discomfort, he is able to move extremities spontaneously Psychiatric: he has restlessness Pertinent Lab and or Imaging Tests: 
Lab Results Component Value Date/Time Sodium 133 (L) 01/28/2021 12:04 AM  
 Potassium 4.1 01/28/2021 12:04 AM  
 Chloride 100 01/28/2021 12:04 AM  
 CO2 22 01/28/2021 12:04 AM  
 Anion gap 11 01/28/2021 12:04 AM  
 Glucose 102 (H) 01/28/2021 12:04 AM  
 BUN 31 (H) 01/28/2021 12:04 AM  
 Creatinine 1.07 01/28/2021 12:04 AM  
 BUN/Creatinine ratio 29 (H) 01/28/2021 12:04 AM  
 GFR est AA >60 01/28/2021 12:04 AM  
 GFR est non-AA >60 01/28/2021 12:04 AM  
 Calcium 10.2 (H) 01/28/2021 12:04 AM  
 
Lab Results Component Value Date/Time  Protein, total 7.5 01/26/2021 01:45 AM  
 Albumin 2.3 (L) 01/26/2021 01:45 AM  
 
   
 Suzanne Flores MD 
[facility]

## 2021-01-30 NOTE — PROGRESS NOTES
Problem: Dyspnea Due to End of Life Goal: Demonstrate understanding of and ability to manage respiratory symptoms at end of life Outcome: Progressing Towards Goal 
  
Problem: Imminent death Goal: Collaborate with patient/family/caregiver/interdisciplinary team to minimize and manage end of life symptoms Outcome: Progressing Towards Goal

## 2021-01-30 NOTE — PROGRESS NOTES
Problem: Pressure Injury - Risk of 
Goal: *Prevention of pressure injury Description: Document Óscar Scale and appropriate interventions in the flowsheet. Outcome: Progressing Towards Goal 
Note: Pressure Injury Interventions: 
Sensory Interventions: Assess changes in LOC Moisture Interventions: Absorbent underpads Activity Interventions: Assess need for specialty bed Mobility Interventions: Assess need for specialty bed Nutrition Interventions: Document food/fluid/supplement intake Friction and Shear Interventions: Apply protective barrier, creams and emollients Problem: Falls - Risk of 
Goal: *Absence of Falls Description: Document Nissa Valentin Fall Risk and appropriate interventions in the flowsheet. Outcome: Progressing Towards Goal 
Note: Fall Risk Interventions: 
Mobility Interventions: Bed/chair exit alarm Mentation Interventions: Adequate sleep, hydration, pain control Medication Interventions: Bed/chair exit alarm Elimination Interventions: Bed/chair exit alarm Problem: Falls - Risk of 
Goal: *Absence of Falls Description: Document Nissa Belloe Fall Risk and appropriate interventions in the flowsheet. Outcome: Progressing Towards Goal 
Note: Fall Risk Interventions: 
Mobility Interventions: Bed/chair exit alarm Mentation Interventions: Adequate sleep, hydration, pain control Medication Interventions: Bed/chair exit alarm Elimination Interventions: Bed/chair exit alarm

## 2021-01-30 NOTE — HOSPICE
0715-Received report from [facility]. Pt is GIP LOC with primary diagnosis of Lung Cancer with Mets. 0745-RN assessment completed. Pt resting supine, eyes prison open. Pt raised arms with care, having labored breathing. Scheduled IV meds given via PIV left hand. Pt skin cool, pt has 3 blankets on. Will monitor for medication effectiveness. 0800-IV medications effective at this time, pt continues to having intermittent irregular, labored breathing, but no dyspnea present. 0830-Pt son Jermaine Palomino and [place]INGTON at bedside. 0900-Pt son called RN to bedside as he thought father had . Pt with long periods of apnea. Explained to son end of life breathing and encouraged him to sit with father, speak to him. Son called pt mother and father and both daughters of pt and placed phone on speaker phone. 0920-Pt pronounced by Zola Schlatter, RN. Pt absent of heart and lung sounds x1 minute. Son Jermaine Palomino and WELLINGTON at bedside, grieving appropriately. 0940-Pt daughters at bedside, grieving appropriately. 1030-Family at bedside, grieving appropriately. 1045-Dr. Best notified by phone of pt death. 1245-Family left [facility] with pt belongings of 2 blankets, flowers, cards. 1300-Post mortem care completed by CNAs. Benjamin Foss & Arie notified for body  at . NAME OF PATIENT:  Eliel Pedro LEVEL OF CARE:  [facility] 
 
REASON FOR GIP:  
Pain, despite numerous changes in medications, Unmanageable respiratory distress, Medication adjustment that must be monitored 24/7 and Stabilizing treatment that cannot take place at home *PATIENT REMAINS ELIGIBLE FOR [facility] LEVEL OF CARE AS EVIDENCED BY: Pt requires IV medication for symptom management of pain and restlessness that cannot be managed in the home setting. REASON FOR RESPITE: 
N/A 
 
O2 SAFETY: 
Concentrator positioning (6\" from furniture/drapes) and No petroleum based products on face while oxygen in use FALL INTERVENTIONS PROVIDED:  
 Implemented/recommended use of fall risk identification flag to all team members, Implemented/recommended resources for alarm system (personal alarm, bed alarm, call bell, etc.) , Implemented/recommended environmental changes (remove hazards, lower bed, improve lighting, etc.) and Implemented/recommended increased supervision/assistance INTERDISPLINARY COMMUNICATION/COLLABORATION: 
Physician, MSW, [place] and RN, CNA 
 
NEW MEDICATION INITIATION DOCUMENTATION: 
No changes made, pt  Reason medication is being initiated:  N/A 
 
MD / Provider name consulted re: change in status / initiation of new medication:  Dr. Samy Solorzano notified of pt death New Symptom(s):  N/A New Order(s):  N/A Name of the person notified of the changes:  N/A Name of person being taught:  N/A Instructions given:  N/A Side Effects taught:  N/A Response to teaching:  N/A 
 
 
COMFORTABLE DYING MEASURE: 
Is Patient/family satisfied with symptom level? Yes [facility]CHARGE PLAN[facility]  [facility]

## 2021-01-30 NOTE — PROGRESS NOTES
Problem: Pressure Injury - Risk of 
Goal: *Prevention of pressure injury Description: Document Óscar Scale and appropriate interventions in the flowsheet. Outcome: Progressing Towards Goal 
Note: Pressure Injury Interventions: 
Sensory Interventions: Assess changes in LOC, Float heels, Maintain/enhance activity level, Monitor skin under medical devices, Assess need for specialty bed, Minimize linen layers, Keep linens dry and wrinkle-free Moisture Interventions: Absorbent underpads, Check for incontinence Q2 hours and as needed Activity Interventions: Assess need for specialty bed Mobility Interventions: Assess need for specialty bed, Float heels Nutrition Interventions: Document food/fluid/supplement intake Friction and Shear Interventions: Apply protective barrier, creams and emollients, Lift sheet, Minimize layers Problem: Patient Education: Go to Patient Education Activity Goal: Patient/Family Education Outcome: Progressing Towards Goal 
  
Problem: Falls - Risk of 
Goal: *Absence of Falls Description: Document Cha Minium Fall Risk and appropriate interventions in the flowsheet. Outcome: Progressing Towards Goal 
Note: Fall Risk Interventions: 
Mobility Interventions: Bed/chair exit alarm, Communicate number of staff needed for ambulation/transfer Mentation Interventions: Adequate sleep, hydration, pain control, Bed/chair exit alarm, Door open when patient unattended Medication Interventions: Bed/chair exit alarm Elimination Interventions: Bed/chair exit alarm, Call light in reach

## 2021-01-31 ENCOUNTER — HOME CARE VISIT (OUTPATIENT)
Dept: HOSPICE | Facility: HOSPICE | Age: 71
End: 2021-01-31
Payer: MEDICARE